# Patient Record
Sex: MALE | Race: WHITE | NOT HISPANIC OR LATINO | Employment: OTHER | URBAN - METROPOLITAN AREA
[De-identification: names, ages, dates, MRNs, and addresses within clinical notes are randomized per-mention and may not be internally consistent; named-entity substitution may affect disease eponyms.]

---

## 2019-11-03 ENCOUNTER — HOSPITAL ENCOUNTER (EMERGENCY)
Facility: HOSPITAL | Age: 64
Discharge: HOME | End: 2019-11-03
Attending: FAMILY MEDICINE
Payer: MEDICARE

## 2019-11-03 VITALS
RESPIRATION RATE: 15 BRPM | WEIGHT: 170.5 LBS | SYSTOLIC BLOOD PRESSURE: 129 MMHG | TEMPERATURE: 98 F | HEART RATE: 59 BPM | DIASTOLIC BLOOD PRESSURE: 72 MMHG | OXYGEN SATURATION: 97 %

## 2019-11-03 DIAGNOSIS — R06.02 SOB (SHORTNESS OF BREATH): ICD-10-CM

## 2019-11-03 DIAGNOSIS — R00.2 PALPITATION: ICD-10-CM

## 2019-11-03 DIAGNOSIS — R00.2 PALPITATIONS: Primary | ICD-10-CM

## 2019-11-03 LAB
ALBUMIN SERPL BCP-MCNC: 4.6 G/DL (ref 3.5–5.2)
ALP SERPL-CCNC: 53 U/L (ref 55–135)
ALT SERPL W/O P-5'-P-CCNC: 12 U/L (ref 10–44)
AMPHET+METHAMPHET UR QL: NEGATIVE
ANION GAP SERPL CALC-SCNC: 11 MMOL/L (ref 8–16)
APTT BLDCRRT: 31.8 SEC (ref 21–32)
AST SERPL-CCNC: 20 U/L (ref 10–40)
BARBITURATES UR QL SCN>200 NG/ML: NEGATIVE
BASOPHILS # BLD AUTO: 0.06 K/UL (ref 0–0.2)
BASOPHILS NFR BLD: 0.7 % (ref 0–1.9)
BENZODIAZ UR QL SCN>200 NG/ML: NEGATIVE
BILIRUB SERPL-MCNC: 0.9 MG/DL (ref 0.1–1)
BILIRUB UR QL STRIP: NEGATIVE
BNP SERPL-MCNC: 20 PG/ML (ref 0–99)
BUN SERPL-MCNC: 14 MG/DL (ref 8–23)
BZE UR QL SCN: NEGATIVE
CALCIUM SERPL-MCNC: 9.9 MG/DL (ref 8.7–10.5)
CANNABINOIDS UR QL SCN: NORMAL
CHLORIDE SERPL-SCNC: 100 MMOL/L (ref 95–110)
CLARITY UR: CLEAR
CO2 SERPL-SCNC: 28 MMOL/L (ref 23–29)
COLOR UR: YELLOW
CREAT SERPL-MCNC: 0.9 MG/DL (ref 0.5–1.4)
CREAT UR-MCNC: 43 MG/DL (ref 23–375)
DIFFERENTIAL METHOD: ABNORMAL
EOSINOPHIL # BLD AUTO: 0.2 K/UL (ref 0–0.5)
EOSINOPHIL NFR BLD: 2.7 % (ref 0–8)
ERYTHROCYTE [DISTWIDTH] IN BLOOD BY AUTOMATED COUNT: 11.4 % (ref 11.5–14.5)
EST. GFR  (AFRICAN AMERICAN): >60 ML/MIN/1.73 M^2
EST. GFR  (NON AFRICAN AMERICAN): >60 ML/MIN/1.73 M^2
GLUCOSE SERPL-MCNC: 93 MG/DL (ref 70–110)
GLUCOSE UR QL STRIP: NEGATIVE
HCT VFR BLD AUTO: 49.4 % (ref 40–54)
HCV AB SERPL QL IA: NEGATIVE
HGB BLD-MCNC: 16.4 G/DL (ref 14–18)
HGB UR QL STRIP: NEGATIVE
IMM GRANULOCYTES # BLD AUTO: 0.03 K/UL (ref 0–0.04)
IMM GRANULOCYTES NFR BLD AUTO: 0.4 % (ref 0–0.5)
INR PPP: 1 (ref 0.8–1.2)
KETONES UR QL STRIP: ABNORMAL
LEUKOCYTE ESTERASE UR QL STRIP: NEGATIVE
LYMPHOCYTES # BLD AUTO: 2.7 K/UL (ref 1–4.8)
LYMPHOCYTES NFR BLD: 31.2 % (ref 18–48)
MAGNESIUM SERPL-MCNC: 2.1 MG/DL (ref 1.6–2.6)
MCH RBC QN AUTO: 31.2 PG (ref 27–31)
MCHC RBC AUTO-ENTMCNC: 33.2 G/DL (ref 32–36)
MCV RBC AUTO: 94 FL (ref 82–98)
METHADONE UR QL SCN>300 NG/ML: NEGATIVE
MONOCYTES # BLD AUTO: 0.6 K/UL (ref 0.3–1)
MONOCYTES NFR BLD: 6.4 % (ref 4–15)
NEUTROPHILS # BLD AUTO: 5 K/UL (ref 1.8–7.7)
NEUTROPHILS NFR BLD: 58.6 % (ref 38–73)
NITRITE UR QL STRIP: NEGATIVE
NRBC BLD-RTO: 0 /100 WBC
OPIATES UR QL SCN: NEGATIVE
PCP UR QL SCN>25 NG/ML: NEGATIVE
PH UR STRIP: 7 [PH] (ref 5–8)
PLATELET # BLD AUTO: 222 K/UL (ref 150–350)
PMV BLD AUTO: 8.8 FL (ref 9.2–12.9)
POTASSIUM SERPL-SCNC: 4.2 MMOL/L (ref 3.5–5.1)
PROT SERPL-MCNC: 7.7 G/DL (ref 6–8.4)
PROT UR QL STRIP: NEGATIVE
PROTHROMBIN TIME: 10.7 SEC (ref 9–12.5)
RBC # BLD AUTO: 5.26 M/UL (ref 4.6–6.2)
SODIUM SERPL-SCNC: 139 MMOL/L (ref 136–145)
SP GR UR STRIP: 1.01 (ref 1–1.03)
TOXICOLOGY INFORMATION: NORMAL
TROPONIN I SERPL DL<=0.01 NG/ML-MCNC: <0.006 NG/ML (ref 0–0.03)
TSH SERPL DL<=0.005 MIU/L-ACNC: 0.72 UIU/ML (ref 0.4–4)
URN SPEC COLLECT METH UR: ABNORMAL
UROBILINOGEN UR STRIP-ACNC: NEGATIVE EU/DL
WBC # BLD AUTO: 8.55 K/UL (ref 3.9–12.7)

## 2019-11-03 PROCEDURE — 80307 DRUG TEST PRSMV CHEM ANLYZR: CPT

## 2019-11-03 PROCEDURE — 84484 ASSAY OF TROPONIN QUANT: CPT

## 2019-11-03 PROCEDURE — 83880 ASSAY OF NATRIURETIC PEPTIDE: CPT

## 2019-11-03 PROCEDURE — 36000 PLACE NEEDLE IN VEIN: CPT

## 2019-11-03 PROCEDURE — 99285 EMERGENCY DEPT VISIT HI MDM: CPT | Mod: 25

## 2019-11-03 PROCEDURE — 80053 COMPREHEN METABOLIC PANEL: CPT

## 2019-11-03 PROCEDURE — 93005 ELECTROCARDIOGRAM TRACING: CPT

## 2019-11-03 PROCEDURE — 85025 COMPLETE CBC W/AUTO DIFF WBC: CPT

## 2019-11-03 PROCEDURE — 85610 PROTHROMBIN TIME: CPT

## 2019-11-03 PROCEDURE — 36415 COLL VENOUS BLD VENIPUNCTURE: CPT

## 2019-11-03 PROCEDURE — 81003 URINALYSIS AUTO W/O SCOPE: CPT | Mod: 59

## 2019-11-03 PROCEDURE — 84443 ASSAY THYROID STIM HORMONE: CPT

## 2019-11-03 PROCEDURE — 93010 ELECTROCARDIOGRAM REPORT: CPT | Mod: ,,, | Performed by: INTERNAL MEDICINE

## 2019-11-03 PROCEDURE — 83735 ASSAY OF MAGNESIUM: CPT

## 2019-11-03 PROCEDURE — 86803 HEPATITIS C AB TEST: CPT

## 2019-11-03 PROCEDURE — 85730 THROMBOPLASTIN TIME PARTIAL: CPT

## 2019-11-03 PROCEDURE — 93010 EKG 12-LEAD: ICD-10-PCS | Mod: ,,, | Performed by: INTERNAL MEDICINE

## 2019-11-03 NOTE — ED PROVIDER NOTES
SCRIBE #1 NOTE: I, Tk Luis, am scribing for, and in the presence of, Denisse Min MD. I have scribed the HPI, ROS, and PEx.     SCRIBE #2 NOTE: I, Don Smith, am scribing for, and in the presence of,  Cecilia Wesley MD. I have scribed the remaining portions of the note not scribed by Scribe #1.     History      Chief Complaint   Patient presents with    Palpitations     c/o hard, fast heartrate at times during the day with some random SOB. Pt denies any symptoms at this time.       Review of patient's allergies indicates:   Allergen Reactions    Pcn [penicillins]         HPI   HPI    11/3/2019, 3:07 PM   History obtained from the patient      History of Present Illness: Júnior Teague is a 64 y.o. male patient who presents to the Emergency Department for palpitations, onset several weeks PTA. Symptoms are intermittent and moderate in severity. No mitigating or exacerbating factors reported. No associated sxs reported. Patient denies any fever, chills, n/v/d, SOB, CP, weakness, numbness, dizziness, headache, and all other sxs at this time. No prior Tx reported. Pt states that he started Flomax 1 month ago. No further complaints or concerns at this time.     Arrival mode: Personal vehicle    PCP: [facility]       Past Medical History:  History reviewed. No pertinent past medical history.    Past Surgical History:  Past Surgical History:   Procedure Laterality Date    HERNIA REPAIR      KNEE SURGERY      L    SHOULDER SURGERY      L         Family History:  History reviewed. No pertinent family history.    Social History:  Social History     Tobacco Use    Smoking status: Former Smoker     Years: 40.00   Substance and Sexual Activity    Alcohol use: Not Currently    Drug use: Yes     Types: Marijuana    Sexual activity: unknown       ROS   Review of Systems   Constitutional: Negative for chills, diaphoresis, fatigue and fever.   HENT: Negative for sore throat.    Respiratory: Negative  for shortness of breath.    Cardiovascular: Positive for palpitations (intermittent). Negative for chest pain.   Gastrointestinal: Negative for nausea and vomiting.   Genitourinary: Negative for dysuria.   Musculoskeletal: Negative for back pain.   Skin: Negative for rash and wound.   Neurological: Negative for dizziness, weakness, light-headedness, numbness and headaches.   Hematological: Does not bruise/bleed easily.   All other systems reviewed and are negative.    Physical Exam      Initial Vitals [11/03/19 1406]   BP Pulse Resp Temp SpO2   138/80 73 18 98.1 °F (36.7 °C) 97 %      MAP       --          Physical Exam  Nursing Notes and Vital Signs Reviewed.  Constitutional: Patient is in no acute distress. Well-developed and well-nourished.  Head: Atraumatic. Normocephalic.  Eyes: PERRL. EOM intact. Conjunctivae are not pale. No scleral icterus.  ENT: Mucous membranes are moist. Oropharynx is clear and symmetric.    Neck: Supple. Full ROM. No lymphadenopathy.  Cardiovascular: Bradycardic. Regular rhythm. No murmurs, rubs, or gallops. Distal pulses are 2+ and symmetric.  Pulmonary/Chest: Decreased breath sounds bilaterally, secondary to body habitus. No wheezing or rales.  Abdominal: Soft and non-distended.  There is no tenderness.  No rebound, guarding, or rigidity.   Musculoskeletal: Moves all extremities. No obvious deformities. No edema.  Skin: Warm and dry.  Neurological:  Alert, awake, and appropriate.  Normal speech.  No acute focal neurological deficits are appreciated.  Psychiatric: Normal affect. Good eye contact. Appropriate in content.    ED Course    Procedures  ED Vital Signs:  Vitals:    11/03/19 1406 11/03/19 1631   BP: 138/80 129/72   Pulse: 73 (!) 59   Resp: 18 15   Temp: 98.1 °F (36.7 °C)    TempSrc: Oral    SpO2: 97% 97%   Weight: 77.3 kg (170 lb 8.4 oz)        Abnormal Lab Results:  Labs Reviewed   CBC W/ AUTO DIFFERENTIAL - Abnormal; Notable for the following components:       Result Value     Mean Corpuscular Hemoglobin 31.2 (*)     RDW 11.4 (*)     MPV 8.8 (*)     All other components within normal limits   COMPREHENSIVE METABOLIC PANEL - Abnormal; Notable for the following components:    Alkaline Phosphatase 53 (*)     All other components within normal limits   URINALYSIS, REFLEX TO URINE CULTURE - Abnormal; Notable for the following components:    Ketones, UA Trace (*)     All other components within normal limits    Narrative:     Preferred Collection Type->Urine, Clean Catch   HEPATITIS C ANTIBODY   APTT   TROPONIN I   B-TYPE NATRIURETIC PEPTIDE   DRUG SCREEN PANEL, URINE EMERGENCY    Narrative:     Preferred Collection Type->Urine, Clean Catch   MAGNESIUM   PROTIME-INR   TSH        All Lab Results:  Results for orders placed or performed during the hospital encounter of 11/03/19   Hepatitis C antibody   Result Value Ref Range    Hepatitis C Ab Negative Negative   APTT   Result Value Ref Range    aPTT 31.8 21.0 - 32.0 sec   CBC auto differential   Result Value Ref Range    WBC 8.55 3.90 - 12.70 K/uL    RBC 5.26 4.60 - 6.20 M/uL    Hemoglobin 16.4 14.0 - 18.0 g/dL    Hematocrit 49.4 40.0 - 54.0 %    Mean Corpuscular Volume 94 82 - 98 fL    Mean Corpuscular Hemoglobin 31.2 (H) 27.0 - 31.0 pg    Mean Corpuscular Hemoglobin Conc 33.2 32.0 - 36.0 g/dL    RDW 11.4 (L) 11.5 - 14.5 %    Platelets 222 150 - 350 K/uL    MPV 8.8 (L) 9.2 - 12.9 fL    Immature Granulocytes 0.4 0.0 - 0.5 %    Gran # (ANC) 5.0 1.8 - 7.7 K/uL    Immature Grans (Abs) 0.03 0.00 - 0.04 K/uL    Lymph # 2.7 1.0 - 4.8 K/uL    Mono # 0.6 0.3 - 1.0 K/uL    Eos # 0.2 0.0 - 0.5 K/uL    Baso # 0.06 0.00 - 0.20 K/uL    nRBC 0 0 /100 WBC    Gran% 58.6 38.0 - 73.0 %    Lymph% 31.2 18.0 - 48.0 %    Mono% 6.4 4.0 - 15.0 %    Eosinophil% 2.7 0.0 - 8.0 %    Basophil% 0.7 0.0 - 1.9 %    Differential Method Automated    Comprehensive metabolic panel   Result Value Ref Range    Sodium 139 136 - 145 mmol/L    Potassium 4.2 3.5 - 5.1 mmol/L    Chloride  100 95 - 110 mmol/L    CO2 28 23 - 29 mmol/L    Glucose 93 70 - 110 mg/dL    BUN, Bld 14 8 - 23 mg/dL    Creatinine 0.9 0.5 - 1.4 mg/dL    Calcium 9.9 8.7 - 10.5 mg/dL    Total Protein 7.7 6.0 - 8.4 g/dL    Albumin 4.6 3.5 - 5.2 g/dL    Total Bilirubin 0.9 0.1 - 1.0 mg/dL    Alkaline Phosphatase 53 (L) 55 - 135 U/L    AST 20 10 - 40 U/L    ALT 12 10 - 44 U/L    Anion Gap 11 8 - 16 mmol/L    eGFR if African American >60 >60 mL/min/1.73 m^2    eGFR if non African American >60 >60 mL/min/1.73 m^2   Troponin I #1   Result Value Ref Range    Troponin I <0.006 0.000 - 0.026 ng/mL   B-Type natriuretic peptide (BNP)   Result Value Ref Range    BNP 20 0 - 99 pg/mL   Drug screen panel, emergency   Result Value Ref Range    Benzodiazepines Negative     Methadone metabolites Negative     Cocaine (Metab.) Negative     Opiate Scrn, Ur Negative     Barbiturate Screen, Ur Negative     Amphetamine Screen, Ur Negative     THC Presumptive Positive     Phencyclidine Negative     Creatinine, Random Ur 43.0 23.0 - 375.0 mg/dL    Toxicology Information SEE COMMENT    Magnesium   Result Value Ref Range    Magnesium 2.1 1.6 - 2.6 mg/dL   Protime-INR   Result Value Ref Range    Prothrombin Time 10.7 9.0 - 12.5 sec    INR 1.0 0.8 - 1.2   Urinalysis, Reflex to Urine Culture Urine, Clean Catch   Result Value Ref Range    Specimen UA Urine, Clean Catch     Color, UA Yellow Yellow, Straw, Treva    Appearance, UA Clear Clear    pH, UA 7.0 5.0 - 8.0    Specific Gravity, UA 1.010 1.005 - 1.030    Protein, UA Negative Negative    Glucose, UA Negative Negative    Ketones, UA Trace (A) Negative    Bilirubin (UA) Negative Negative    Occult Blood UA Negative Negative    Nitrite, UA Negative Negative    Urobilinogen, UA Negative <2.0 EU/dL    Leukocytes, UA Negative Negative   TSH   Result Value Ref Range    TSH 0.718 0.400 - 4.000 uIU/mL     Imaging Results:  Imaging Results          X-Ray Chest AP Portable (Final result)  Result time 11/03/19 14:50:43     Final result by LISA Flores Sr., MD (11/03/19 14:50:43)                 Impression:      1. The lungs are clear.  2. There is a minimal amount of levoconvex curvature of the lumbar spine.  3. There are several oval-shaped osseous densities projected superior to the lateral end of the right clavicle.  One of the larger ones measures 10 mm.  These are characteristic of fracture fragments or unfused accessory ossification centers.      Electronically signed by: Doyle Flores MD  Date:    11/03/2019  Time:    14:50             Narrative:    EXAMINATION:  XR CHEST AP PORTABLE    CLINICAL HISTORY:  Chest Pain;    COMPARISON:  None    FINDINGS:  The size of the heart is within normal limits.  The lungs are clear.  There is no pneumothorax.  The costophrenic angles are sharp.  There is a minimal amount of levoconvex curvature of the lumbar spine.  There are several oval-shaped osseous densities projected superior to the lateral end of the right clavicle.  One of the larger ones measures 10 mm.                               The EKG was ordered, reviewed, and independently interpreted by the ED provider.  Interpretation time: 15:09  Rate: 64 BPM  Rhythm: normal sinus rhythm  Interpretation: No acute ST changes. No STEMI.           The Emergency Provider reviewed the vital signs and test results, which are outlined above.    ED Discussion          4:00 PM: Dr. Min transfers care of pt to Dr. Sy Wesley pending lab results.    4:39 PM: Reassessed pt at this time, Intermittent palpitations x several weeks, no exacerbating factors, no chest pain, no shortness of breath or dizziness, no risk factors for ACS, clinically stable for discharge and outpatient follow up.  Pt states his condition has improved at this time. Discussed with pt all pertinent ED information and results. Discussed pt dx and plan of tx. Gave pt all f/u and return to the ED instructions. All questions and concerns were addressed at this time. Pt  expresses understanding of information and instructions, and is comfortable with plan to discharge. Pt is stable for discharge. Instructed pt to follow up with cardiologist ASAP.    I discussed with patient and/or family/caretaker that evaluation in the ED does not suggest any emergent or life threatening medical conditions requiring immediate intervention beyond what was provided in the ED, and I believe patient is safe for discharge.  Regardless, an unremarkable evaluation in the ED does not preclude the development or presence of a serious of life threatening condition. As such, patient was instructed to return immediately for any worsening or change in current symptoms.      ED Medication(s):  Medications - No data to display    Follow-up Information     [facility]. Schedule an appointment as soon as possible for a visit in 1 day.           [facility]. Schedule an appointment as soon as possible for a visit in 1 day.    Specialty:  Cardiology  Why:  Return to the Emergency Room, If symptoms worsen  Contact information:  [address] [facility] [address]  [phone]  Additional information:  3rd Floor - From [address], take the [facility] exit ([address]). Enter the facility from the [address]                New Prescriptions    No medications on file          Medication List      You have not been prescribed any medications.           Medical Decision Making    Medical Decision Making:   Clinical Tests:   Lab Tests: Ordered and Reviewed  Radiological Study: Ordered and Reviewed  Medical Tests: Ordered and Reviewed           Scribe Attestation:   Scribe #1: I performed the above scribed service and the documentation accurately describes the services I performed. I attest to the accuracy of the note.    Attending:   Physician Attestation Statement for Scribe #1: I, Denisse Min MD, personally performed the services described in this documentation, as scribed by Tk  Janelle, in my presence, and it is both accurate and complete.       Scribe Attestation:   Scribe #2: I performed the above scribed service and the documentation accurately describes the services I performed. I attest to the accuracy of the note.    Attending Attestation:           Physician Attestation for Scribe:    Physician Attestation Statement for Scribe #2: I, Cecilia Wesley MD, reviewed documentation, as scribed by Don Smith in my presence, and it is both accurate and complete. I also acknowledge and confirm the content of the note done by Scribe #1.          Clinical Impression       ICD-10-CM ICD-9-CM   1. Palpitations R00.2 785.1   2. Palpitation R00.2 785.1   3. SOB (shortness of breath) R06.02 786.05       Disposition:   Disposition: Discharged  Condition: Stable         Cecilia Wesley MD  11/03/19 3224

## 2019-11-04 ENCOUNTER — OFFICE VISIT (OUTPATIENT)
Dept: SURGERY | Facility: CLINIC | Age: 64
End: 2019-11-04
Payer: MEDICARE

## 2019-11-04 VITALS
TEMPERATURE: 99 F | HEIGHT: 71 IN | BODY MASS INDEX: 23.8 KG/M2 | HEART RATE: 62 BPM | SYSTOLIC BLOOD PRESSURE: 126 MMHG | WEIGHT: 170 LBS | DIASTOLIC BLOOD PRESSURE: 85 MMHG

## 2019-11-04 DIAGNOSIS — N40.0 ENLARGED PROSTATE: ICD-10-CM

## 2019-11-04 DIAGNOSIS — R00.0 TACHYCARDIA: ICD-10-CM

## 2019-11-04 DIAGNOSIS — K40.90 NON-RECURRENT INGUINAL HERNIA OF RIGHT SIDE WITHOUT OBSTRUCTION OR GANGRENE: Primary | ICD-10-CM

## 2019-11-04 PROCEDURE — 99999 PR PBB SHADOW E&M-EST. PATIENT-LVL III: ICD-10-PCS | Mod: PBBFAC,,, | Performed by: SURGERY

## 2019-11-04 PROCEDURE — 99204 OFFICE O/P NEW MOD 45 MIN: CPT | Mod: S$GLB,,, | Performed by: SURGERY

## 2019-11-04 PROCEDURE — 99204 PR OFFICE/OUTPT VISIT, NEW, LEVL IV, 45-59 MIN: ICD-10-PCS | Mod: S$GLB,,, | Performed by: SURGERY

## 2019-11-04 PROCEDURE — 99213 OFFICE O/P EST LOW 20 MIN: CPT | Mod: PBBFAC | Performed by: SURGERY

## 2019-11-04 PROCEDURE — 99999 PR PBB SHADOW E&M-EST. PATIENT-LVL III: CPT | Mod: PBBFAC,,, | Performed by: SURGERY

## 2019-11-04 RX ORDER — TAMSULOSIN HYDROCHLORIDE 0.4 MG/1
0.4 CAPSULE ORAL DAILY
COMMUNITY

## 2019-11-04 NOTE — PROGRESS NOTES
Patient ID: Júnior Teague is a 64 y.o. male.    Right inguinal hernia    Chief Complaint: Consult and Hernia      HPI:  Patient was found to have a right inguinal hernia by his primary care doctor.  He is sent for surgical evaluation.  He notices a small bulge in the right groin.  Does not have significant pain.    Patient was in the emergency room over the weekend for tachycardiac and has a cardiology appointment on Thursday.      Review of Systems   Constitutional: Negative.    HENT: Negative.    Eyes: Negative.    Respiratory: Negative.    Cardiovascular: Negative.    Gastrointestinal: Negative.         Small right groin   Endocrine: Negative.    Genitourinary: Negative.    Musculoskeletal: Negative.    Skin: Negative.    Allergic/Immunologic: Negative.    Neurological: Negative.    Hematological: Negative.    Psychiatric/Behavioral: Negative.        Current Outpatient Medications   Medication Sig Dispense Refill    tamsulosin (FLOMAX) 0.4 mg Cap Take 0.4 mg by mouth once daily.       No current facility-administered medications for this visit.        Review of patient's allergies indicates:   Allergen Reactions    Pcn [penicillins]        Past Medical History:   Diagnosis Date    Enlarged prostate        Past Surgical History:   Procedure Laterality Date    HERNIA REPAIR Left     open, mesh ([facility] )    KNEE SURGERY      L    SHOULDER SURGERY      L       No family history on file.    Social History     Socioeconomic History    Marital status: Single     Spouse name: Not on file    Number of children: Not on file    Years of education: Not on file    Highest education level: Not on file   Occupational History    Not on file   Social Needs    Financial resource strain: Not on file    Food insecurity:     Worry: Not on file     Inability: Not on file    Transportation needs:     Medical: Not on file     Non-medical: Not on file   Tobacco Use    Smoking status: Former Smoker     Years: 40.00    Substance and Sexual Activity    Alcohol use: Not Currently    Drug use: Yes     Types: Marijuana    Sexual activity: Not on file   Lifestyle    Physical activity:     Days per week: Not on file     Minutes per session: Not on file    Stress: Not on file   Relationships    Social connections:     Talks on phone: Not on file     Gets together: Not on file     Attends Sabianist service: Not on file     Active member of club or organization: Not on file     Attends meetings of clubs or organizations: Not on file     Relationship status: Not on file   Other Topics Concern    Not on file   Social History Narrative    Not on file       Vitals:    11/04/19 1547   BP: 126/85   Pulse: 62   Temp: 98.5 °F (36.9 °C)       Physical Exam   Constitutional: He is oriented to person, place, and time. He appears well-developed and well-nourished. No distress.   HENT:   Head: Normocephalic and atraumatic.   Eyes: Pupils are equal, round, and reactive to light. No scleral icterus.   Neck: Normal range of motion. Neck supple. No JVD present. No tracheal deviation present. No thyromegaly present.   Cardiovascular: Normal rate, regular rhythm and normal heart sounds.   Pulmonary/Chest: Effort normal and breath sounds normal.   Abdominal: Soft. Bowel sounds are normal. He exhibits no distension and no mass. There is no tenderness. There is no rebound and no guarding. A hernia (Reducible right inguinal) is present.   Musculoskeletal: Normal range of motion.   Lymphadenopathy:     He has no cervical adenopathy.   Neurological: He is alert and oriented to person, place, and time.   Skin: Skin is warm and dry.   Psychiatric: He has a normal mood and affect. His behavior is normal. Judgment and thought content normal.        Emergency room records labs EKG and imaging studies were all reviewed    Assessment & Plan:    reducible right inguinal hernia. I would recommend a robotic repair with mesh.  The rationale for the robotic repair  was discussed.  The rationale for using mesh was discussed.  Consent was obtained.    The patient will need to see his cardiologist on Thursday.  Once the cardiologist has completed their workup and if there are no contraindications to surgery will contact the patient and schedule the robotic hernia repair.      Risk of hernia repair includes infection, bleeding, mesh infection, testicular atrophy, and pain or numbness in the groin that may be long-lasting.

## 2019-11-04 NOTE — PATIENT INSTRUCTIONS
"The stone instructions for surgery are as follows.    Once we decide on the date the hospital call you the night before with a time for arrival.      There is no solid food after midnight, but you may have clear liquids up to 3 hr before your arrival time    Please take all your medicines with a sip of water the morning of surgery, especially the Flomax.    Please bathe with the soap as instruction    Our office phone numbers are  [phone] and       [facility]  Instructions for Patients and Families    You are invited to share your experience with me and my staff.  If you receive a survey in the mail, please return it at your convenience, or complete a brief survey on [employer].  We value your opinion!        Did you know that [facility] can be used to make appointments?  Just select "Schedule Appointment" under the "Visits" menu.    Notify you if any of your preop laboratories show abnormalities.  I    Before surgery:  The pre-op nurse from the hospital will call you before the day of your surgery to confirm your arrival time.  The time of your surgery may change due to emergencies or other unforeseen events.  Do not eat or drink anything after midnight the night before your procedure, except for clear liquids up to three hours before your surgery time, and sips of water with medication.  If you are not diabetic, it is recommended that you drink a glass of clear fruit juice (apple, grape, cranberry, not orange) three hours before your surgery time, but nothing after that.  If you are diabetic, you may have water or sugar-free clear liquids such as Crystal Light up to three hours before your surgery time.    Day of Surgery:  · You will go to a pre-op area where an IV will be started and you will speak to the anesthesiologist and surgeon.  · Your family will be updated throughout the operation.  After surgery, your family member may be taken to a private room for " consultation with the surgeon.  This is for the privacy of your medical information and does not necessarily mean there is anything wrong.    If your incisions have:  · Glue:  You may take a bath or shower immediately and wash your skin as you normally do.  The glue will eventually crumble or peel off. Do not let your incisions soak under water.  · Strips: Leave them on, but it is OK if they fall off on their own. It is OK to get them wet 48 hours after surgery.  · Bandage: You may remove it 2 days after your surgery, and then you may leave the incision open and take a shower or bath, unless otherwise instructed. If your bandage has clear plastic, you may shower with it on and then remove it 2 days after surgery.    Activities  · Walking is recommended after surgery; bed rest is not recommended unless specifically ordered.  · If you have had abdominal surgery, do not lift over 20 pounds for 4 weeks after surgery.  · If you have had hernia surgery, do not lift over 20 pounds for 6 weeks after surgery.  · You may drive when you are off your post-operative pain medication.  · Do not smoke after surgery, it decreases your ability to heal and increases the risk of infection and pneumonia.    Diet:  Drink lots of fluids after surgery.  You might not have much of an appetite at first, you may eat regular food when you feel ready, unless you are given special diet instructions.    Post-operative symptoms and medications  · It is safe to take over-the-counter medications for constipation, heartburn, sleep, or itching if needed.  Prescription pain medication may contain acetaminophen (Tylenol), so you should not take additional acetaminophen (Tylenol) at the same time as your pain medication.  · You may experience nausea, low fever/chills, and clear drainage from your incision, sometimes up to a month after surgery.  Notify our office if you have fever over 101 degrees, worsening redness around your incision, thick cloudy  "drainage, or inability to drink any liquids.  · You will experience some level of pain after surgery.  Your pain medication should help with the pain, but may not be able to eliminate it entirely.  Pain will decrease with time, and most pain will be gone by 4 to 6 weeks after surgery.  · We are not able to call in prescriptions for pain medication after hours or on weekends.  If your pain medication is ineffective or you will run out soon and need a refill, please call our office at [phone].  We are not able to replace pain medication that has been lost or stolen.    After surgery, you will either be discharged home or admitted to the hospital.  If you are admitted to the hospital, one of the surgeons or a physician assistant will see you once a day.  Due to scheduled surgery, we may see you in the afternoon or at night; however, your nurse is able to page us at any time.  If you feel there is a situation that is not being addressed properly, please dial [phone] from the phone in your room.    Follow-up appointment  · You will see your surgeon or a physician assistant in clinic for a follow-up appointment at either our [facility] (off [facility]) or [facility] (off [facility]) locations, usually between one and four weeks after your surgery.  · The hospital nurses can make your follow up appointment, or you can make it online at [email or web address] or call [phone].  · If you have a smartphone with the [employer] xu, please let us know if you would like to do a phone visit instead of a post-op office visit.    If you are signed up for [facility], install the "[employer]" xu to access your test results, send messages to your doctors, and schedule appointments from your smartphone!    "

## 2019-11-07 ENCOUNTER — OFFICE VISIT (OUTPATIENT)
Dept: CARDIOLOGY | Facility: CLINIC | Age: 64
End: 2019-11-07
Payer: MEDICARE

## 2019-11-07 VITALS
BODY MASS INDEX: 23.86 KG/M2 | HEART RATE: 62 BPM | DIASTOLIC BLOOD PRESSURE: 78 MMHG | WEIGHT: 171.06 LBS | SYSTOLIC BLOOD PRESSURE: 120 MMHG

## 2019-11-07 DIAGNOSIS — R00.2 PALPITATIONS: Primary | ICD-10-CM

## 2019-11-07 DIAGNOSIS — Z01.810 PREOP CARDIOVASCULAR EXAM: ICD-10-CM

## 2019-11-07 PROCEDURE — 99204 PR OFFICE/OUTPT VISIT, NEW, LEVL IV, 45-59 MIN: ICD-10-PCS | Mod: S$GLB,,, | Performed by: INTERNAL MEDICINE

## 2019-11-07 PROCEDURE — 99999 PR PBB SHADOW E&M-EST. PATIENT-LVL II: ICD-10-PCS | Mod: PBBFAC,,, | Performed by: INTERNAL MEDICINE

## 2019-11-07 PROCEDURE — 99999 PR PBB SHADOW E&M-EST. PATIENT-LVL II: CPT | Mod: PBBFAC,,, | Performed by: INTERNAL MEDICINE

## 2019-11-07 PROCEDURE — 99212 OFFICE O/P EST SF 10 MIN: CPT | Mod: PBBFAC | Performed by: INTERNAL MEDICINE

## 2019-11-07 PROCEDURE — 99204 OFFICE O/P NEW MOD 45 MIN: CPT | Mod: S$GLB,,, | Performed by: INTERNAL MEDICINE

## 2019-11-07 RX ORDER — IBUPROFEN 200 MG
200 TABLET ORAL EVERY 6 HOURS PRN
COMMUNITY

## 2019-11-07 NOTE — PROGRESS NOTES
Subjective:   Patient ID:  Júnior Teague is a 64 y.o. male who presents for cardiac consult of Palpitations (consult )      HPI  The patient came in today for cardiac consult of Palpitations (consult )    19  Júnior Teague is a 64 y.o. male pt with BPH, prior tobacco use,  presents for initial CV eval for palpitations/pre op CV eval.    He was in the ER earlier for palpitations, onset several weeks PTA. Symptoms were  intermittent and moderate in severity. No mitigating or exacerbating factors reported. No associated sxs reported. Patient denies any fever, chills, n/v/d, SOB, CP, weakness, numbness, dizziness, headache, and all other sxs at this time. No prior Tx reported. Labwork, ECG, CXR negative. UDS positive for THC.  FH - CAD    He also has a right inguinal hernia and will need surgery by Dr. Garcia.     ECG - NSR    He has been having palpitations a few months recently but has resolved recently as he stopped drinking coffee. Overall active, walks a dog twice a day.     Patient no chest pain, no sob, no leg swelling, no PND,  no dizziness, no syncope, no CNS symptoms.    Patient has fairly good exercise tolerance.    Patient is compliant with medications.    FH - CAD, father  of MI 71    Past Medical History:   Diagnosis Date    Enlarged prostate        Past Surgical History:   Procedure Laterality Date    HERNIA REPAIR Left     open, mesh ([facility] )    KNEE SURGERY      L    SHOULDER SURGERY      L       Social History     Tobacco Use    Smoking status: Former Smoker     Years: 40.00   Substance Use Topics    Alcohol use: Not Currently    Drug use: Yes     Types: Marijuana     Comment: daily 2 or 3 joints        Family History   Problem Relation Age of Onset    Heart attack Father        Patient's Medications   New Prescriptions    No medications on file   Previous Medications    IBUPROFEN (ADVIL,MOTRIN) 200 MG TABLET    Take 200 mg by mouth every 6 (six) hours as needed.     TAMSULOSIN (FLOMAX) 0.4 MG CAP    Take 0.4 mg by mouth once daily.   Modified Medications    No medications on file   Discontinued Medications    No medications on file       Review of Systems   Constitutional: Negative.    HENT: Negative.    Eyes: Negative.    Respiratory: Negative.    Cardiovascular: Positive for palpitations.   Gastrointestinal: Negative.    Genitourinary: Negative.    Musculoskeletal: Negative.    Skin: Negative.    Neurological: Negative.    Endo/Heme/Allergies: Negative.    Psychiatric/Behavioral: Negative.    All 12 systems otherwise negative.      Wt Readings from Last 3 Encounters:   11/07/19 77.6 kg (171 lb 1.2 oz)   11/04/19 77.1 kg (170 lb)   11/03/19 77.3 kg (170 lb 8.4 oz)     Temp Readings from Last 3 Encounters:   11/04/19 98.5 °F (36.9 °C)   11/03/19 98.1 °F (36.7 °C) (Oral)     BP Readings from Last 3 Encounters:   11/07/19 120/78   11/04/19 126/85   11/03/19 129/72     Pulse Readings from Last 3 Encounters:   11/07/19 62   11/04/19 62   11/03/19 (!) 59       /78 (BP Location: Left arm, Patient Position: Sitting, BP Method: Medium (Manual))   Pulse 62   Wt 77.6 kg (171 lb 1.2 oz)   BMI 23.86 kg/m²     Objective:   Physical Exam   Constitutional: He is oriented to person, place, and time. He appears well-developed and well-nourished. No distress.   HENT:   Head: Normocephalic and atraumatic.   Nose: Nose normal.   Mouth/Throat: Oropharynx is clear and moist.   Eyes: Conjunctivae and EOM are normal. No scleral icterus.   Neck: Normal range of motion. Neck supple. No JVD present. No thyromegaly present.   Cardiovascular: Normal rate, regular rhythm, S1 normal and S2 normal. Exam reveals no gallop, no S3, no S4 and no friction rub.   No murmur heard.  Pulmonary/Chest: Effort normal and breath sounds normal. No stridor. No respiratory distress. He has no wheezes. He has no rales. He exhibits no tenderness.   Abdominal: Soft. Bowel sounds are normal. He exhibits no distension  and no mass. There is no tenderness. There is no rebound.   Genitourinary:   Genitourinary Comments: Deferred   Musculoskeletal: Normal range of motion. He exhibits no edema, tenderness or deformity.   Lymphadenopathy:     He has no cervical adenopathy.   Neurological: He is alert and oriented to person, place, and time. He exhibits normal muscle tone. Coordination normal.   Skin: Skin is warm and dry. No rash noted. He is not diaphoretic. No erythema. No pallor.   Psychiatric: He has a normal mood and affect. His behavior is normal. Judgment and thought content normal.   Nursing note and vitals reviewed.      Lab Results   Component Value Date     11/03/2019    K 4.2 11/03/2019     11/03/2019    CO2 28 11/03/2019    BUN 14 11/03/2019    CREATININE 0.9 11/03/2019    GLU 93 11/03/2019    MG 2.1 11/03/2019    AST 20 11/03/2019    ALT 12 11/03/2019    ALBUMIN 4.6 11/03/2019    PROT 7.7 11/03/2019    BILITOT 0.9 11/03/2019    WBC 8.55 11/03/2019    HGB 16.4 11/03/2019    HCT 49.4 11/03/2019    MCV 94 11/03/2019     11/03/2019    INR 1.0 11/03/2019    TSH 0.718 11/03/2019    BNP 20 11/03/2019     Assessment:      1. Palpitations    2. Preop cardiovascular exam        Plan:   1. Palpitations  - neg workup in ER  - check Holter, avoid caffeine    2. Preop CV eval prior to hernia surgery  - ECG stress test and 2D ECHO to eval valves  - if negative may proceed, will risk stratify     ADDENDUM 12/17/19  Pre-OP CV evaluation prior to hernia surgery  Low periop risk of CV events for moderate risk procedure.  Good functional and exercise capacity.  No chest pain, active arrhythmia and CHF symptoms.  Ok to proceed to the scheduled surgery without further cardiac study.    Negative stress and ECHO     Thank you for allowing me to participate in this patient's care. Please do not hesitate to contact me with any questions or concerns. Consult note has been forwarded to the referral physician.     Marin Lehman MD,  [facility]  Cardiovascular Disease  [facility], [place]  [phone] (P)

## 2019-11-13 ENCOUNTER — TELEPHONE (OUTPATIENT)
Dept: SURGERY | Facility: CLINIC | Age: 64
End: 2019-11-13

## 2019-11-13 NOTE — TELEPHONE ENCOUNTER
----- Message from Era Bronson sent at 11/13/2019  7:42 AM CST -----  Contact: Shayna Teague/sister [phone]  States that she is calling to schedule pt surgery. States that he has been cleared by cardiology. Please call back at [phone]//thank you acc

## 2019-12-16 ENCOUNTER — CLINICAL SUPPORT (OUTPATIENT)
Dept: CARDIOLOGY | Facility: CLINIC | Age: 64
End: 2019-12-16
Attending: INTERNAL MEDICINE
Payer: MEDICARE

## 2019-12-16 DIAGNOSIS — Z01.810 PREOP CARDIOVASCULAR EXAM: ICD-10-CM

## 2019-12-16 DIAGNOSIS — R00.2 PALPITATIONS: ICD-10-CM

## 2019-12-16 LAB
DIASTOLIC DYSFUNCTION: NO
DIASTOLIC DYSFUNCTION: NO
ESTIMATED PA SYSTOLIC PRESSURE: 24.9
RETIRED EF AND QEF - SEE NOTES: 55 (ref 55–65)

## 2019-12-16 PROCEDURE — 93015 CARDIAC TREADMILL STRESS TEST: ICD-10-PCS | Mod: S$GLB,,, | Performed by: INTERNAL MEDICINE

## 2019-12-16 PROCEDURE — 93015 CV STRESS TEST SUPVJ I&R: CPT | Mod: S$GLB,,, | Performed by: INTERNAL MEDICINE

## 2019-12-16 PROCEDURE — 93224 HOLTER MONITOR - 48 HOUR: ICD-10-PCS | Mod: S$GLB,,, | Performed by: INTERNAL MEDICINE

## 2019-12-16 PROCEDURE — 93306 TTE W/DOPPLER COMPLETE: CPT | Mod: S$GLB,,, | Performed by: INTERNAL MEDICINE

## 2019-12-16 PROCEDURE — 93224 XTRNL ECG REC UP TO 48 HRS: CPT | Mod: S$GLB,,, | Performed by: INTERNAL MEDICINE

## 2019-12-16 PROCEDURE — 93306 2D ECHO WITH COLOR FLOW DOPPLER: ICD-10-PCS | Mod: S$GLB,,, | Performed by: INTERNAL MEDICINE

## 2019-12-17 ENCOUNTER — TELEPHONE (OUTPATIENT)
Dept: CARDIOLOGY | Facility: CLINIC | Age: 64
End: 2019-12-17

## 2019-12-17 NOTE — TELEPHONE ENCOUNTER
Spoke with pt about his results, pt states understanding.      Overall normal stress test with mildly elevated BP, awaiting ECHO, will complete cardiac clearance once resulted but should be fine for surgery

## 2019-12-19 ENCOUNTER — TELEPHONE (OUTPATIENT)
Dept: CARDIOLOGY | Facility: CLINIC | Age: 64
End: 2019-12-19

## 2019-12-19 NOTE — TELEPHONE ENCOUNTER
----- Message from Marin Lehman MD sent at 12/17/2019  9:06 AM CST -----  Please call the patient regarding his normal result. Overall normal heart function and valves. Will addened note for surgery clearance.

## 2019-12-19 NOTE — TELEPHONE ENCOUNTER
----- Message from Marin Lehman MD sent at 12/19/2019  9:24 AM CST -----  Please call the patient regarding his result. Overall normal heart monitor.

## 2019-12-20 ENCOUNTER — TELEPHONE (OUTPATIENT)
Dept: CARDIOLOGY | Facility: CLINIC | Age: 64
End: 2019-12-20

## 2019-12-20 NOTE — TELEPHONE ENCOUNTER
----- Message from Eleni Nicole sent at 12/20/2019  8:17 AM CST -----  Contact: Pt  Pt asked that nurse return his call regarding his test results.

## 2019-12-20 NOTE — TELEPHONE ENCOUNTER
Returned call to patient--gave results of holter and echo--patient verbalizes understanding of all information

## 2020-01-14 ENCOUNTER — OFFICE VISIT (OUTPATIENT)
Dept: CARDIOLOGY | Facility: CLINIC | Age: 65
End: 2020-01-14
Payer: MEDICARE

## 2020-01-14 VITALS
SYSTOLIC BLOOD PRESSURE: 120 MMHG | WEIGHT: 173.94 LBS | HEIGHT: 71 IN | DIASTOLIC BLOOD PRESSURE: 74 MMHG | OXYGEN SATURATION: 96 % | BODY MASS INDEX: 24.35 KG/M2 | HEART RATE: 68 BPM

## 2020-01-14 DIAGNOSIS — R00.2 PALPITATIONS: Primary | ICD-10-CM

## 2020-01-14 DIAGNOSIS — Z01.810 PREOP CARDIOVASCULAR EXAM: ICD-10-CM

## 2020-01-14 PROCEDURE — 1101F PR PT FALLS ASSESS DOC 0-1 FALLS W/OUT INJ PAST YR: ICD-10-PCS | Mod: CPTII,S$GLB,, | Performed by: INTERNAL MEDICINE

## 2020-01-14 PROCEDURE — 1101F PT FALLS ASSESS-DOCD LE1/YR: CPT | Mod: CPTII,S$GLB,, | Performed by: INTERNAL MEDICINE

## 2020-01-14 PROCEDURE — 99999 PR PBB SHADOW E&M-EST. PATIENT-LVL III: ICD-10-PCS | Mod: PBBFAC,,, | Performed by: INTERNAL MEDICINE

## 2020-01-14 PROCEDURE — 99214 PR OFFICE/OUTPT VISIT, EST, LEVL IV, 30-39 MIN: ICD-10-PCS | Mod: S$GLB,,, | Performed by: INTERNAL MEDICINE

## 2020-01-14 PROCEDURE — 3008F PR BODY MASS INDEX (BMI) DOCUMENTED: ICD-10-PCS | Mod: CPTII,S$GLB,, | Performed by: INTERNAL MEDICINE

## 2020-01-14 PROCEDURE — 3008F BODY MASS INDEX DOCD: CPT | Mod: CPTII,S$GLB,, | Performed by: INTERNAL MEDICINE

## 2020-01-14 PROCEDURE — 99214 OFFICE O/P EST MOD 30 MIN: CPT | Mod: S$GLB,,, | Performed by: INTERNAL MEDICINE

## 2020-01-14 PROCEDURE — 99999 PR PBB SHADOW E&M-EST. PATIENT-LVL III: CPT | Mod: PBBFAC,,, | Performed by: INTERNAL MEDICINE

## 2020-01-14 NOTE — PROGRESS NOTES
Subjective:   Patient ID:  Júnior Teague is a 64 y.o. male who presents for cardiac consult of Palpitations (2 month f/u)      HPI  The patient came in today for cardiac consult of Palpitations (2 month f/u)      Júnior Teague is a 64 y.o. male pt with BPH, prior tobacco use presents for follow up CV eval.     19  He was in the ER earlier for palpitations, onset several weeks PTA. Symptoms were  intermittent and moderate in severity. No mitigating or exacerbating factors reported. No associated sxs reported. Patient denies any fever, chills, n/v/d, SOB, CP, weakness, numbness, dizziness, headache, and all other sxs at this time. No prior Tx reported. Labwork, ECG, CXR negative. UDS positive for THC.  FH - CAD  He also has a right inguinal hernia and will need surgery by Dr. Garcia.   ECG - NSR  He has been having palpitations a few months recently but has resolved recently as he stopped drinking coffee. Overall active, walks a dog twice a day.     20  ECHO neg, ETT neg but target HR not reached. Holter neg. He is still waiting for hernia surgery. Symptoms are getting worse with mild pain and bulge.     Patient no chest pain, no sob, no leg swelling, no PND,  no dizziness, no syncope, no CNS symptoms.    Patient has fairly good exercise tolerance.    Patient is compliant with medications.    FH - CAD, father  of MI 71      CONCLUSIONS     1 - Concentric remodeling.     2 - No wall motion abnormalities.     3 - Normal left ventricular systolic function (EF 55-60%).     4 - Normal left ventricular diastolic function.     5 - Normal right ventricular systolic function .     6 - The estimated PA systolic pressure is 25 mmHg.     This document has been electronically    SIGNED BY: Estiven Andrews MD On: 2019 17:14    EKG Conclusions:  1. The EKG portion of this study is negative for ischemia at a low workload, and peak heart rate of 113 bpm (73% of predicted).   2. Exercise capacity is average.   3. Blood  pressure response to exercise was hypertensive (Presenting BP: 120/82 Peak BP: 190/98).   4. No significant arrhythmias were present.   5. There were no symptoms of chest discomfort or significant dyspnea throughout the protocol.   6. The Duke treadmill score was 9 suggesting a low probability for future cardiovascular events.    Target heart rate not reached but no ischemia noted.     This document has been electronically    SIGNED BY: Marin Lehman MD On: 12/16/2019 14:02      TEST DESCRIPTION   PREDOMINANT RHYTHM  1. Sinus rhythm with heart rates varying between 39 and 117 bpm with an average of 65 bpm.   VENTRICULAR ARRHYTHMIAS  1. There were very rare PVCs recorded totalling 8 and averaging less than 1 per hour.   2. There were no episodes of ventricular tachycardia.    SUPRA VENTRICULAR ARRHYTHMIAS  1. There were rare PACs totalling 199 and averaging 4 per hour.  There were 9 couplets. There was 1 triplet.   2. There were no episodes of sustained supraventricular tachycardia.    Past Medical History:   Diagnosis Date    Enlarged prostate        Past Surgical History:   Procedure Laterality Date    HERNIA REPAIR Left     open, mesh ([facility] )    KNEE SURGERY      L    SHOULDER SURGERY      L       Social History     Tobacco Use    Smoking status: Former Smoker     Years: 40.00   Substance Use Topics    Alcohol use: Not Currently    Drug use: Yes     Types: Marijuana     Comment: daily 2 or 3 joints        Family History   Problem Relation Age of Onset    Heart attack Father        Patient's Medications   New Prescriptions    No medications on file   Previous Medications    IBUPROFEN (ADVIL,MOTRIN) 200 MG TABLET    Take 200 mg by mouth every 6 (six) hours as needed.    TAMSULOSIN (FLOMAX) 0.4 MG CAP    Take 0.4 mg by mouth once daily.   Modified Medications    No medications on file   Discontinued Medications    No medications on file       Review of Systems   Constitutional: Negative.    HENT:  "Negative.    Eyes: Negative.    Respiratory: Negative.    Cardiovascular: Positive for palpitations.   Gastrointestinal: Negative.    Genitourinary: Negative.    Musculoskeletal: Negative.    Skin: Negative.    Neurological: Negative.    Endo/Heme/Allergies: Negative.    Psychiatric/Behavioral: Negative.    All 12 systems otherwise negative.      Wt Readings from Last 3 Encounters:   01/14/20 78.9 kg (173 lb 15.1 oz)   11/07/19 77.6 kg (171 lb 1.2 oz)   11/04/19 77.1 kg (170 lb)     Temp Readings from Last 3 Encounters:   11/04/19 98.5 °F (36.9 °C)   11/03/19 98.1 °F (36.7 °C) (Oral)     BP Readings from Last 3 Encounters:   01/14/20 120/74   11/07/19 120/78   11/04/19 126/85     Pulse Readings from Last 3 Encounters:   01/14/20 68   11/07/19 62   11/04/19 62       /74 (BP Location: Left arm, Patient Position: Sitting, BP Method: Medium (Manual))   Pulse 68   Ht 5' 11" (1.803 m)   Wt 78.9 kg (173 lb 15.1 oz)   SpO2 96%   BMI 24.26 kg/m²     Objective:   Physical Exam   Constitutional: He is oriented to person, place, and time. He appears well-developed and well-nourished. No distress.   HENT:   Head: Normocephalic and atraumatic.   Nose: Nose normal.   Mouth/Throat: Oropharynx is clear and moist.   Eyes: Conjunctivae and EOM are normal. No scleral icterus.   Neck: Normal range of motion. Neck supple. No JVD present. No thyromegaly present.   Cardiovascular: Normal rate, regular rhythm, S1 normal and S2 normal. Exam reveals no gallop, no S3, no S4 and no friction rub.   No murmur heard.  Pulmonary/Chest: Effort normal and breath sounds normal. No stridor. No respiratory distress. He has no wheezes. He has no rales. He exhibits no tenderness.   Abdominal: Soft. Bowel sounds are normal. He exhibits no distension and no mass. There is no tenderness. There is no rebound.   Genitourinary:   Genitourinary Comments: Deferred   Musculoskeletal: Normal range of motion. He exhibits no edema, tenderness or deformity. "   Lymphadenopathy:     He has no cervical adenopathy.   Neurological: He is alert and oriented to person, place, and time. He exhibits normal muscle tone. Coordination normal.   Skin: Skin is warm and dry. No rash noted. He is not diaphoretic. No erythema. No pallor.   Psychiatric: He has a normal mood and affect. His behavior is normal. Judgment and thought content normal.   Nursing note and vitals reviewed.      Lab Results   Component Value Date     11/03/2019    K 4.2 11/03/2019     11/03/2019    CO2 28 11/03/2019    BUN 14 11/03/2019    CREATININE 0.9 11/03/2019    GLU 93 11/03/2019    MG 2.1 11/03/2019    AST 20 11/03/2019    ALT 12 11/03/2019    ALBUMIN 4.6 11/03/2019    PROT 7.7 11/03/2019    BILITOT 0.9 11/03/2019    WBC 8.55 11/03/2019    HGB 16.4 11/03/2019    HCT 49.4 11/03/2019    MCV 94 11/03/2019     11/03/2019    INR 1.0 11/03/2019    TSH 0.718 11/03/2019    BNP 20 11/03/2019     Assessment:      1. Palpitations    2. Preop cardiovascular exam        Plan:   1. Palpitations  - neg workup in ER  - Holter - neg, avoid caffeine  - echo normal, stress neg    2. Pre-OP CV evaluation prior to hernia surgery  Low periop risk of CV events for moderate risk procedure.  Good functional and exercise capacity.  No chest pain, active arrhythmia and CHF symptoms.  Ok to proceed to the scheduled surgery without further cardiac study.      Thank you for allowing me to participate in this patient's care. Please do not hesitate to contact me with any questions or concerns. Consult note has been forwarded to the referral physician.     Marin Lehman MD, [facility]  Cardiovascular Disease  [facility], [place]  [phone] (P)

## 2020-01-30 ENCOUNTER — OFFICE VISIT (OUTPATIENT)
Dept: SURGERY | Facility: CLINIC | Age: 65
End: 2020-01-30
Payer: MEDICARE

## 2020-01-30 ENCOUNTER — LAB VISIT (OUTPATIENT)
Dept: LAB | Facility: HOSPITAL | Age: 65
End: 2020-01-30
Attending: SURGERY
Payer: MEDICARE

## 2020-01-30 VITALS
SYSTOLIC BLOOD PRESSURE: 119 MMHG | WEIGHT: 177.5 LBS | HEART RATE: 68 BPM | TEMPERATURE: 98 F | DIASTOLIC BLOOD PRESSURE: 66 MMHG | BODY MASS INDEX: 24.75 KG/M2

## 2020-01-30 DIAGNOSIS — K40.90 NON-RECURRENT INGUINAL HERNIA OF RIGHT SIDE WITHOUT OBSTRUCTION OR GANGRENE: Primary | ICD-10-CM

## 2020-01-30 DIAGNOSIS — K40.90 INGUINAL HERNIA OF RIGHT SIDE WITHOUT OBSTRUCTION OR GANGRENE: ICD-10-CM

## 2020-01-30 DIAGNOSIS — K40.90 NON-RECURRENT INGUINAL HERNIA OF RIGHT SIDE WITHOUT OBSTRUCTION OR GANGRENE: ICD-10-CM

## 2020-01-30 LAB
ALBUMIN SERPL BCP-MCNC: 4 G/DL (ref 3.5–5.2)
ALP SERPL-CCNC: 73 U/L (ref 55–135)
ALT SERPL W/O P-5'-P-CCNC: 19 U/L (ref 10–44)
ANION GAP SERPL CALC-SCNC: 7 MMOL/L (ref 8–16)
AST SERPL-CCNC: 19 U/L (ref 10–40)
BASOPHILS # BLD AUTO: 0.05 K/UL (ref 0–0.2)
BASOPHILS NFR BLD: 0.6 % (ref 0–1.9)
BILIRUB SERPL-MCNC: 0.2 MG/DL (ref 0.1–1)
BUN SERPL-MCNC: 18 MG/DL (ref 8–23)
CALCIUM SERPL-MCNC: 9.3 MG/DL (ref 8.7–10.5)
CHLORIDE SERPL-SCNC: 106 MMOL/L (ref 95–110)
CO2 SERPL-SCNC: 29 MMOL/L (ref 23–29)
CREAT SERPL-MCNC: 0.9 MG/DL (ref 0.5–1.4)
DIFFERENTIAL METHOD: ABNORMAL
EOSINOPHIL # BLD AUTO: 0.4 K/UL (ref 0–0.5)
EOSINOPHIL NFR BLD: 4.5 % (ref 0–8)
ERYTHROCYTE [DISTWIDTH] IN BLOOD BY AUTOMATED COUNT: 12 % (ref 11.5–14.5)
EST. GFR  (AFRICAN AMERICAN): >60 ML/MIN/1.73 M^2
EST. GFR  (NON AFRICAN AMERICAN): >60 ML/MIN/1.73 M^2
GLUCOSE SERPL-MCNC: 91 MG/DL (ref 70–110)
HCT VFR BLD AUTO: 46 % (ref 40–54)
HGB BLD-MCNC: 14.2 G/DL (ref 14–18)
IMM GRANULOCYTES # BLD AUTO: 0.03 K/UL (ref 0–0.04)
IMM GRANULOCYTES NFR BLD AUTO: 0.4 % (ref 0–0.5)
LYMPHOCYTES # BLD AUTO: 3 K/UL (ref 1–4.8)
LYMPHOCYTES NFR BLD: 37.5 % (ref 18–48)
MCH RBC QN AUTO: 30.3 PG (ref 27–31)
MCHC RBC AUTO-ENTMCNC: 30.9 G/DL (ref 32–36)
MCV RBC AUTO: 98 FL (ref 82–98)
MONOCYTES # BLD AUTO: 0.6 K/UL (ref 0.3–1)
MONOCYTES NFR BLD: 7.7 % (ref 4–15)
NEUTROPHILS # BLD AUTO: 3.9 K/UL (ref 1.8–7.7)
NEUTROPHILS NFR BLD: 49.3 % (ref 38–73)
NRBC BLD-RTO: 0 /100 WBC
PLATELET # BLD AUTO: 238 K/UL (ref 150–350)
PMV BLD AUTO: 9.7 FL (ref 9.2–12.9)
POTASSIUM SERPL-SCNC: 4.4 MMOL/L (ref 3.5–5.1)
PROT SERPL-MCNC: 7 G/DL (ref 6–8.4)
RBC # BLD AUTO: 4.68 M/UL (ref 4.6–6.2)
SODIUM SERPL-SCNC: 142 MMOL/L (ref 136–145)
WBC # BLD AUTO: 7.95 K/UL (ref 3.9–12.7)

## 2020-01-30 PROCEDURE — 1101F PT FALLS ASSESS-DOCD LE1/YR: CPT | Mod: CPTII,S$GLB,, | Performed by: SURGERY

## 2020-01-30 PROCEDURE — 3008F BODY MASS INDEX DOCD: CPT | Mod: CPTII,S$GLB,, | Performed by: SURGERY

## 2020-01-30 PROCEDURE — 99213 PR OFFICE/OUTPT VISIT, EST, LEVL III, 20-29 MIN: ICD-10-PCS | Mod: S$GLB,,, | Performed by: SURGERY

## 2020-01-30 PROCEDURE — 85025 COMPLETE CBC W/AUTO DIFF WBC: CPT

## 2020-01-30 PROCEDURE — 80053 COMPREHEN METABOLIC PANEL: CPT

## 2020-01-30 PROCEDURE — 99999 PR PBB SHADOW E&M-EST. PATIENT-LVL IV: ICD-10-PCS | Mod: PBBFAC,,, | Performed by: SURGERY

## 2020-01-30 PROCEDURE — 3008F PR BODY MASS INDEX (BMI) DOCUMENTED: ICD-10-PCS | Mod: CPTII,S$GLB,, | Performed by: SURGERY

## 2020-01-30 PROCEDURE — 99213 OFFICE O/P EST LOW 20 MIN: CPT | Mod: S$GLB,,, | Performed by: SURGERY

## 2020-01-30 PROCEDURE — 36415 COLL VENOUS BLD VENIPUNCTURE: CPT

## 2020-01-30 PROCEDURE — 1101F PR PT FALLS ASSESS DOC 0-1 FALLS W/OUT INJ PAST YR: ICD-10-PCS | Mod: CPTII,S$GLB,, | Performed by: SURGERY

## 2020-01-30 PROCEDURE — 99999 PR PBB SHADOW E&M-EST. PATIENT-LVL IV: CPT | Mod: PBBFAC,,, | Performed by: SURGERY

## 2020-01-30 RX ORDER — ONDANSETRON 4 MG/1
8 TABLET, ORALLY DISINTEGRATING ORAL EVERY 8 HOURS PRN
Status: CANCELLED | OUTPATIENT
Start: 2020-01-30

## 2020-01-30 RX ORDER — LIDOCAINE HYDROCHLORIDE 10 MG/ML
1 INJECTION, SOLUTION EPIDURAL; INFILTRATION; INTRACAUDAL; PERINEURAL ONCE
Status: DISCONTINUED | OUTPATIENT
Start: 2020-01-30 | End: 2020-02-07 | Stop reason: HOSPADM

## 2020-02-03 NOTE — PROGRESS NOTES
Patient ID: Júnior Teague is a 64 y.o. male.    Chief Complaint: Follow-up      HPI:  Patient presents once again to discuss his right hernia repair he had been seen by Cardiology and there is no cardiac contraindications.    He states the hernia slightly larger.  It causes him some mild discomfort      Review of Systems   Constitutional: Negative.    HENT: Negative.    Eyes: Negative.    Respiratory: Negative.    Cardiovascular: Negative.    Gastrointestinal: Negative.         Right groin bulge   Endocrine: Negative.    Genitourinary: Negative.    Musculoskeletal: Negative.    Skin: Negative.    Allergic/Immunologic: Negative.    Neurological: Negative.    Hematological: Negative.    Psychiatric/Behavioral: Negative.        Current Outpatient Medications   Medication Sig Dispense Refill    ibuprofen (ADVIL,MOTRIN) 200 MG tablet Take 200 mg by mouth every 6 (six) hours as needed.      tamsulosin (FLOMAX) 0.4 mg Cap Take 0.4 mg by mouth once daily.       Current Facility-Administered Medications   Medication Dose Route Frequency Provider Last Rate Last Dose    lidocaine (PF) 10 mg/ml (1%) injection 10 mg  1 mL Intradermal Once Earl Garcia MD           Review of patient's allergies indicates:   Allergen Reactions    Pcn [penicillins]        Past Medical History:   Diagnosis Date    Enlarged prostate        Past Surgical History:   Procedure Laterality Date    HERNIA REPAIR Left     open, mesh ([facility] )    KNEE SURGERY      L    SHOULDER SURGERY      L       Family History   Problem Relation Age of Onset    Heart attack Father        Social History     Socioeconomic History    Marital status: Single     Spouse name: Not on file    Number of children: Not on file    Years of education: Not on file    Highest education level: Not on file   Occupational History    Not on file   Social Needs    Financial resource strain: Not on file    Food insecurity:     Worry: Not on file     Inability: Not  on file    Transportation needs:     Medical: Not on file     Non-medical: Not on file   Tobacco Use    Smoking status: Former Smoker     Years: 40.00   Substance and Sexual Activity    Alcohol use: Not Currently    Drug use: Yes     Types: Marijuana     Comment: daily 2 or 3 joints     Sexual activity: Not on file   Lifestyle    Physical activity:     Days per week: Not on file     Minutes per session: Not on file    Stress: Not on file   Relationships    Social connections:     Talks on phone: Not on file     Gets together: Not on file     Attends Catholic service: Not on file     Active member of club or organization: Not on file     Attends meetings of clubs or organizations: Not on file     Relationship status: Not on file   Other Topics Concern    Not on file   Social History Narrative    Not on file       Vitals:    01/30/20 1419   BP: 119/66   Pulse: 68   Temp: 97.8 °F (36.6 °C)       Physical Exam   Constitutional: He is oriented to person, place, and time. He appears well-developed and well-nourished. No distress.   HENT:   Head: Normocephalic and atraumatic.   Eyes: Pupils are equal, round, and reactive to light. No scleral icterus.   Neck: Normal range of motion. Neck supple. No tracheal deviation present.   Cardiovascular: Normal rate, regular rhythm and normal heart sounds.   Pulmonary/Chest: Effort normal and breath sounds normal.   Abdominal: Soft. Bowel sounds are normal. He exhibits no distension and no mass. There is no tenderness. There is no rebound and no guarding. A hernia (Reducible right inguinal hernia) is present.   Musculoskeletal: Normal range of motion.   Neurological: He is alert and oriented to person, place, and time.   Skin: Skin is warm and dry. Capillary refill takes less than 2 seconds.   Psychiatric: He has a normal mood and affect. His behavior is normal. Judgment and thought content normal.       Cardiac evaluation was reviewed  Assessment & Plan:      Reducible  right inguinal hernia.    Open repair with mesh either under general anesthetic or sedation local anesthetic per anesthesiology.    Preop labs were ordered    Risk of hernia repair includes infection, bleeding, mesh infection, testicular atrophy, and pain or numbness in the groin that may be long-lasting.

## 2020-02-03 NOTE — H&P (VIEW-ONLY)
Patient ID: Júnior Teague is a 64 y.o. male.    Chief Complaint: Follow-up      HPI:  Patient presents once again to discuss his right hernia repair he had been seen by Cardiology and there is no cardiac contraindications.    He states the hernia slightly larger.  It causes him some mild discomfort      Review of Systems   Constitutional: Negative.    HENT: Negative.    Eyes: Negative.    Respiratory: Negative.    Cardiovascular: Negative.    Gastrointestinal: Negative.         Right groin bulge   Endocrine: Negative.    Genitourinary: Negative.    Musculoskeletal: Negative.    Skin: Negative.    Allergic/Immunologic: Negative.    Neurological: Negative.    Hematological: Negative.    Psychiatric/Behavioral: Negative.        Current Outpatient Medications   Medication Sig Dispense Refill    ibuprofen (ADVIL,MOTRIN) 200 MG tablet Take 200 mg by mouth every 6 (six) hours as needed.      tamsulosin (FLOMAX) 0.4 mg Cap Take 0.4 mg by mouth once daily.       Current Facility-Administered Medications   Medication Dose Route Frequency Provider Last Rate Last Dose    lidocaine (PF) 10 mg/ml (1%) injection 10 mg  1 mL Intradermal Once Earl Garcia MD           Review of patient's allergies indicates:   Allergen Reactions    Pcn [penicillins]        Past Medical History:   Diagnosis Date    Enlarged prostate        Past Surgical History:   Procedure Laterality Date    HERNIA REPAIR Left     open, mesh ([facility] )    KNEE SURGERY      L    SHOULDER SURGERY      L       Family History   Problem Relation Age of Onset    Heart attack Father        Social History     Socioeconomic History    Marital status: Single     Spouse name: Not on file    Number of children: Not on file    Years of education: Not on file    Highest education level: Not on file   Occupational History    Not on file   Social Needs    Financial resource strain: Not on file    Food insecurity:     Worry: Not on file     Inability: Not  on file    Transportation needs:     Medical: Not on file     Non-medical: Not on file   Tobacco Use    Smoking status: Former Smoker     Years: 40.00   Substance and Sexual Activity    Alcohol use: Not Currently    Drug use: Yes     Types: Marijuana     Comment: daily 2 or 3 joints     Sexual activity: Not on file   Lifestyle    Physical activity:     Days per week: Not on file     Minutes per session: Not on file    Stress: Not on file   Relationships    Social connections:     Talks on phone: Not on file     Gets together: Not on file     Attends Shinto service: Not on file     Active member of club or organization: Not on file     Attends meetings of clubs or organizations: Not on file     Relationship status: Not on file   Other Topics Concern    Not on file   Social History Narrative    Not on file       Vitals:    01/30/20 1419   BP: 119/66   Pulse: 68   Temp: 97.8 °F (36.6 °C)       Physical Exam   Constitutional: He is oriented to person, place, and time. He appears well-developed and well-nourished. No distress.   HENT:   Head: Normocephalic and atraumatic.   Eyes: Pupils are equal, round, and reactive to light. No scleral icterus.   Neck: Normal range of motion. Neck supple. No tracheal deviation present.   Cardiovascular: Normal rate, regular rhythm and normal heart sounds.   Pulmonary/Chest: Effort normal and breath sounds normal.   Abdominal: Soft. Bowel sounds are normal. He exhibits no distension and no mass. There is no tenderness. There is no rebound and no guarding. A hernia (Reducible right inguinal hernia) is present.   Musculoskeletal: Normal range of motion.   Neurological: He is alert and oriented to person, place, and time.   Skin: Skin is warm and dry. Capillary refill takes less than 2 seconds.   Psychiatric: He has a normal mood and affect. His behavior is normal. Judgment and thought content normal.       Cardiac evaluation was reviewed  Assessment & Plan:      Reducible  right inguinal hernia.    Open repair with mesh either under general anesthetic or sedation local anesthetic per anesthesiology.    Preop labs were ordered    Risk of hernia repair includes infection, bleeding, mesh infection, testicular atrophy, and pain or numbness in the groin that may be long-lasting.

## 2020-02-06 NOTE — PRE ADMISSION SCREENING
Pre op instructions reviewed with patient per phone:    To confirm, Your surgeon has instructed you:  Surgery is scheduled 02/07/20 at 1220.      Please report to [facility] 1st floor main lobby by 1050.  Pre admit office to call afternoon prior to surgery with final arrival time only if arrival time changes.     INSTRUCTIONS IMPORTANT!!!  ¨ No smoking after 12 midnight, the night before surgery.  ¨ No solid food after 12 midnight, but you may have clear liquids up until 3 hours prior to surgery.  This includes: grape, cranberry, and apple juice (not orange, and no coffee.)   ¨ OK to brush teeth, but no gum, candy or mints!    ¨ Take only these medicines with a small swallow of water-morning of surgery.  N/A  ____  Do not wear makeup, including mascara.  ____  No powder, lotions or creams to surgical area.  ____  Please remove all jewelry, including piercings and leave at home.  ____  No money or valuables needed. Please leave at home.  ____  Please bring identification and insurance information to hospital.  ____  If going home the same day, arrange for a ride home. You will not be able to   drive if Anesthesia was used.  ____  Children, under 12 years old, must remain in the waiting room with an adult.  They are not allowed in patient areas.  ____  Wear loose fitting clothing. Allow for dressings, bandages.  ____  Stop Aspirin, Ibuprofen, Motrin and Aleve at least 5-7 days before surgery, unless otherwise instructed by your doctor, or the nurse.   You MAY use Tylenol/acetaminophen until day of surgery.  ____  If you take diabetic medication, do not take am of surgery unless instructed by   Doctor.  ____ Stop taking any Fish Oil supplement or any Vitamins that contain Vitamin E at least 5 days prior to surgery.          Bathing Instructions-- The night before surgery and the morning prior to coming to the hospital:   -Do not shave the surgical area.   -Shower and wash your hair and body as  usual with your regular soap and shampoo.   -Rinse your hair and body completely.   -Use one packet of hibiclens to wash the surgical site (using your hand) gently for 5 minutes.  Do not scrub you skin too hard.   -Do not use hibiclens on your head, face, or genitals.   -Do not wash with regular soap after you use the hibiclens.   -Rinse your body thoroughly.   -Dry with clean, soft towel.  Do not use lotion, cream, deodorant, or powders on   the surgical site.    Use antibacterial soap in place of hibiclens if your surgery is on the head, face or genitals.         Surgical Site Infection    Prevention of surgical site infections:     -Keep incisions clean and dry.   -Do not soak/submerge incisions in water until completely healed.   -Do not apply lotions, powders, creams, or deodorants to site.   -Always make sure hands are cleaned with antibacterial soap/ alcohol-based   prior to touching the surgical site.  (This includes doctors, nurses, staff, and yourself.)    Signs and symptoms:   -Redness and pain around the area where you had surgery   -Drainage of cloudy fluid from your surgical wound   -Fever over 100.4  I have read or had read and explained to me, and understand the above information.

## 2020-02-07 ENCOUNTER — HOSPITAL ENCOUNTER (OUTPATIENT)
Facility: HOSPITAL | Age: 65
Discharge: HOME | End: 2020-02-07
Attending: SURGERY | Admitting: SURGERY
Payer: MEDICARE

## 2020-02-07 ENCOUNTER — ANESTHESIA EVENT (OUTPATIENT)
Dept: SURGERY | Facility: HOSPITAL | Age: 65
End: 2020-02-07
Payer: MEDICARE

## 2020-02-07 ENCOUNTER — ANESTHESIA (OUTPATIENT)
Dept: SURGERY | Facility: HOSPITAL | Age: 65
End: 2020-02-07
Payer: MEDICARE

## 2020-02-07 VITALS
HEIGHT: 71 IN | RESPIRATION RATE: 20 BRPM | SYSTOLIC BLOOD PRESSURE: 141 MMHG | BODY MASS INDEX: 24.43 KG/M2 | TEMPERATURE: 98 F | WEIGHT: 174.5 LBS | OXYGEN SATURATION: 98 % | DIASTOLIC BLOOD PRESSURE: 85 MMHG | HEART RATE: 64 BPM

## 2020-02-07 DIAGNOSIS — K40.90 NON-RECURRENT UNILATERAL INGUINAL HERNIA WITHOUT OBSTRUCTION OR GANGRENE: Primary | ICD-10-CM

## 2020-02-07 PROCEDURE — 37000008 HC ANESTHESIA 1ST 15 MINUTES: Performed by: SURGERY

## 2020-02-07 PROCEDURE — S0077 INJECTION, CLINDAMYCIN PHOSP: HCPCS | Performed by: SURGERY

## 2020-02-07 PROCEDURE — 36000707: Performed by: SURGERY

## 2020-02-07 PROCEDURE — 49505 PRP I/HERN INIT REDUC >5 YR: CPT | Mod: RT,,, | Performed by: SURGERY

## 2020-02-07 PROCEDURE — 25000003 PHARM REV CODE 250: Performed by: NURSE ANESTHETIST, CERTIFIED REGISTERED

## 2020-02-07 PROCEDURE — 25000003 PHARM REV CODE 250: Performed by: SURGERY

## 2020-02-07 PROCEDURE — 37000009 HC ANESTHESIA EA ADD 15 MINS: Performed by: SURGERY

## 2020-02-07 PROCEDURE — 71000033 HC RECOVERY, INTIAL HOUR: Performed by: SURGERY

## 2020-02-07 PROCEDURE — C1781 MESH (IMPLANTABLE): HCPCS | Performed by: SURGERY

## 2020-02-07 PROCEDURE — 49505 PR REPAIR ING HERNIA,5+Y/O,REDUCIBL: ICD-10-PCS | Mod: RT,,, | Performed by: SURGERY

## 2020-02-07 PROCEDURE — C1729 CATH, DRAINAGE: HCPCS | Performed by: SURGERY

## 2020-02-07 PROCEDURE — 63600175 PHARM REV CODE 636 W HCPCS: Performed by: NURSE ANESTHETIST, CERTIFIED REGISTERED

## 2020-02-07 PROCEDURE — 36000706: Performed by: SURGERY

## 2020-02-07 PROCEDURE — 71000015 HC POSTOP RECOV 1ST HR: Performed by: SURGERY

## 2020-02-07 DEVICE — PLUG MEDIUM: Type: IMPLANTABLE DEVICE | Site: GROIN | Status: FUNCTIONAL

## 2020-02-07 RX ORDER — DEXAMETHASONE SODIUM PHOSPHATE 4 MG/ML
INJECTION, SOLUTION INTRA-ARTICULAR; INTRALESIONAL; INTRAMUSCULAR; INTRAVENOUS; SOFT TISSUE
Status: DISCONTINUED | OUTPATIENT
Start: 2020-02-07 | End: 2020-02-07

## 2020-02-07 RX ORDER — HYDROMORPHONE HYDROCHLORIDE 2 MG/ML
1 INJECTION, SOLUTION INTRAMUSCULAR; INTRAVENOUS; SUBCUTANEOUS EVERY 4 HOURS PRN
Status: DISCONTINUED | OUTPATIENT
Start: 2020-02-07 | End: 2020-02-07 | Stop reason: HOSPADM

## 2020-02-07 RX ORDER — HYDROCODONE BITARTRATE AND ACETAMINOPHEN 5; 325 MG/1; MG/1
1 TABLET ORAL EVERY 6 HOURS PRN
Qty: 20 TABLET | Refills: 0 | Status: SHIPPED | OUTPATIENT
Start: 2020-02-07 | End: 2020-03-02

## 2020-02-07 RX ORDER — ONDANSETRON 2 MG/ML
4 INJECTION INTRAMUSCULAR; INTRAVENOUS DAILY PRN
Status: DISCONTINUED | OUTPATIENT
Start: 2020-02-07 | End: 2020-02-07 | Stop reason: HOSPADM

## 2020-02-07 RX ORDER — PHENYLEPHRINE HYDROCHLORIDE 10 MG/ML
INJECTION INTRAVENOUS
Status: DISCONTINUED | OUTPATIENT
Start: 2020-02-07 | End: 2020-02-07

## 2020-02-07 RX ORDER — MIDAZOLAM HYDROCHLORIDE 1 MG/ML
INJECTION, SOLUTION INTRAMUSCULAR; INTRAVENOUS
Status: DISCONTINUED | OUTPATIENT
Start: 2020-02-07 | End: 2020-02-07

## 2020-02-07 RX ORDER — HYDROCODONE BITARTRATE AND ACETAMINOPHEN 7.5; 325 MG/1; MG/1
1 TABLET ORAL EVERY 6 HOURS PRN
Status: DISCONTINUED | OUTPATIENT
Start: 2020-02-07 | End: 2020-02-07 | Stop reason: HOSPADM

## 2020-02-07 RX ORDER — FENTANYL CITRATE 50 UG/ML
25 INJECTION, SOLUTION INTRAMUSCULAR; INTRAVENOUS EVERY 5 MIN PRN
Status: DISCONTINUED | OUTPATIENT
Start: 2020-02-07 | End: 2020-02-07 | Stop reason: HOSPADM

## 2020-02-07 RX ORDER — BUPIVACAINE HYDROCHLORIDE 2.5 MG/ML
INJECTION, SOLUTION EPIDURAL; INFILTRATION; INTRACAUDAL
Status: DISCONTINUED | OUTPATIENT
Start: 2020-02-07 | End: 2020-02-07 | Stop reason: HOSPADM

## 2020-02-07 RX ORDER — KETOROLAC TROMETHAMINE 30 MG/ML
INJECTION, SOLUTION INTRAMUSCULAR; INTRAVENOUS
Status: DISCONTINUED | OUTPATIENT
Start: 2020-02-07 | End: 2020-02-07

## 2020-02-07 RX ORDER — ONDANSETRON 8 MG/1
8 TABLET, ORALLY DISINTEGRATING ORAL EVERY 8 HOURS PRN
Status: DISCONTINUED | OUTPATIENT
Start: 2020-02-07 | End: 2020-02-07 | Stop reason: HOSPADM

## 2020-02-07 RX ORDER — SODIUM CHLORIDE, SODIUM LACTATE, POTASSIUM CHLORIDE, CALCIUM CHLORIDE 600; 310; 30; 20 MG/100ML; MG/100ML; MG/100ML; MG/100ML
INJECTION, SOLUTION INTRAVENOUS CONTINUOUS PRN
Status: DISCONTINUED | OUTPATIENT
Start: 2020-02-07 | End: 2020-02-07

## 2020-02-07 RX ORDER — PROPOFOL 10 MG/ML
VIAL (ML) INTRAVENOUS
Status: DISCONTINUED | OUTPATIENT
Start: 2020-02-07 | End: 2020-02-07

## 2020-02-07 RX ORDER — CLINDAMYCIN PHOSPHATE 900 MG/50ML
900 INJECTION, SOLUTION INTRAVENOUS
Status: COMPLETED | OUTPATIENT
Start: 2020-02-07 | End: 2020-02-07

## 2020-02-07 RX ORDER — FENTANYL CITRATE 50 UG/ML
INJECTION, SOLUTION INTRAMUSCULAR; INTRAVENOUS
Status: DISCONTINUED | OUTPATIENT
Start: 2020-02-07 | End: 2020-02-07

## 2020-02-07 RX ORDER — HYDROCODONE BITARTRATE AND ACETAMINOPHEN 5; 325 MG/1; MG/1
1 TABLET ORAL EVERY 4 HOURS PRN
Status: DISCONTINUED | OUTPATIENT
Start: 2020-02-07 | End: 2020-02-07 | Stop reason: HOSPADM

## 2020-02-07 RX ORDER — ONDANSETRON 2 MG/ML
INJECTION INTRAMUSCULAR; INTRAVENOUS
Status: DISCONTINUED | OUTPATIENT
Start: 2020-02-07 | End: 2020-02-07

## 2020-02-07 RX ORDER — GLYCOPYRROLATE 0.2 MG/ML
INJECTION INTRAMUSCULAR; INTRAVENOUS
Status: DISCONTINUED | OUTPATIENT
Start: 2020-02-07 | End: 2020-02-07

## 2020-02-07 RX ADMIN — PHENYLEPHRINE HYDROCHLORIDE 100 MCG: 10 INJECTION INTRAVENOUS at 08:02

## 2020-02-07 RX ADMIN — FENTANYL CITRATE 50 MCG: 50 INJECTION, SOLUTION INTRAMUSCULAR; INTRAVENOUS at 08:02

## 2020-02-07 RX ADMIN — FENTANYL CITRATE 50 MCG: 50 INJECTION, SOLUTION INTRAMUSCULAR; INTRAVENOUS at 09:02

## 2020-02-07 RX ADMIN — DEXAMETHASONE SODIUM PHOSPHATE 4 MG: 4 INJECTION, SOLUTION INTRA-ARTICULAR; INTRALESIONAL; INTRAMUSCULAR; INTRAVENOUS; SOFT TISSUE at 08:02

## 2020-02-07 RX ADMIN — PROPOFOL 100 MG: 10 INJECTION, EMULSION INTRAVENOUS at 08:02

## 2020-02-07 RX ADMIN — PHENYLEPHRINE HYDROCHLORIDE 100 MCG: 10 INJECTION INTRAVENOUS at 09:02

## 2020-02-07 RX ADMIN — MIDAZOLAM 2 MG: 1 INJECTION INTRAMUSCULAR; INTRAVENOUS at 08:02

## 2020-02-07 RX ADMIN — PROPOFOL 200 MG: 10 INJECTION, EMULSION INTRAVENOUS at 08:02

## 2020-02-07 RX ADMIN — KETOROLAC TROMETHAMINE 30 MG: 30 INJECTION, SOLUTION INTRAMUSCULAR; INTRAVENOUS at 09:02

## 2020-02-07 RX ADMIN — SODIUM CHLORIDE, SODIUM LACTATE, POTASSIUM CHLORIDE, AND CALCIUM CHLORIDE: 600; 310; 30; 20 INJECTION, SOLUTION INTRAVENOUS at 08:02

## 2020-02-07 RX ADMIN — ONDANSETRON 4 MG: 2 INJECTION, SOLUTION INTRAMUSCULAR; INTRAVENOUS at 08:02

## 2020-02-07 RX ADMIN — ROBINUL 0.2 MG: 0.2 INJECTION INTRAMUSCULAR; INTRAVENOUS at 08:02

## 2020-02-07 RX ADMIN — CLINDAMYCIN PHOSPHATE 900 MG: 18 INJECTION, SOLUTION INTRAVENOUS at 08:02

## 2020-02-07 NOTE — ANESTHESIA PREPROCEDURE EVALUATION
02/07/2020  Júnior Teague is a 64 y.o., male.    Anesthesia Evaluation    I have reviewed the Patient Summary Reports.    I have reviewed the Nursing Notes.   I have reviewed the Medications.     Review of Systems  Anesthesia Hx:  No problems with previous Anesthesia Denies Hx of Anesthetic complications  Denies Family Hx of Anesthesia complications.   Denies Personal Hx of Anesthesia complications.   Social:  Non-Smoker, No Alcohol Use    Cardiovascular:  Cardiovascular Normal  ECG has been reviewed.    Pulmonary:  Pulmonary Normal    Renal/:  Renal/ Normal     Hepatic/GI:  Hepatic/GI Normal    Neurological:  Neurology Normal    Endocrine:  Endocrine Normal    Psych:  Psychiatric Normal         Patient Active Problem List   Diagnosis    Enlarged prostate    Preop cardiovascular exam    Palpitations         Physical Exam  General:  Well nourished    Airway/Jaw/Neck:  Airway Findings: Mouth Opening: Normal Tongue: Normal  General Airway Assessment: Adult  Mallampati: II  TM Distance: 4 - 6 cm  Jaw/Neck Findings:  Neck ROM: Normal ROM      Dental:  Dental Findings: In tact   Chest/Lungs:  Chest/Lungs Findings: Clear to auscultation, Normal Respiratory Rate     Heart/Vascular:  Heart Findings: Rate: Normal  Rhythm: Regular Rhythm  Sounds: Normal        Mental Status:  Mental Status Findings:  Cooperative, Alert and Oriented       Lab Results   Component Value Date    WBC 7.95 01/30/2020    HGB 14.2 01/30/2020    HCT 46.0 01/30/2020    MCV 98 01/30/2020     01/30/2020         Chemistry        Component Value Date/Time     01/30/2020 1540    K 4.4 01/30/2020 1540     01/30/2020 1540    CO2 29 01/30/2020 1540    BUN 18 01/30/2020 1540    CREATININE 0.9 01/30/2020 1540    GLU 91 01/30/2020 1540        Component Value Date/Time    CALCIUM 9.3 01/30/2020 1540    ALKPHOS 73 01/30/2020  1540    AST 19 01/30/2020 1540    ALT 19 01/30/2020 1540    BILITOT 0.2 01/30/2020 1540    ESTGFRAFRICA >60.0 01/30/2020 1540    EGFRNONAA >60.0 01/30/2020 1540            Anesthesia Plan  Type of Anesthesia, risks & benefits discussed:  Anesthesia Type:  general  Patient's Preference:   Intra-op Monitoring Plan: standard ASA monitors  Intra-op Monitoring Plan Comments:   Post Op Pain Control Plan: per primary service following discharge from PACU  Post Op Pain Control Plan Comments:   Induction:   IV  Beta Blocker:  Patient is not currently on a Beta-Blocker (No further documentation required).       Informed Consent: Patient understands risks and agrees with Anesthesia plan.  Questions answered. Anesthesia consent signed with patient.  ASA Score: 1     Day of Surgery Review of History & Physical: I have interviewed and examined the patient. I have reviewed the patient's H&P dated:  There are no significant changes.  H&P update referred to the surgeon.         Ready For Surgery From Anesthesia Perspective.

## 2020-02-07 NOTE — TRANSFER OF CARE
"Anesthesia Transfer of Care Note    Patient: Júnior Teague    Procedure(s) Performed: Procedure(s) (LRB):  REPAIR, HERNIA, INGUINAL, WITHOUT HISTORY OF PRIOR REPAIR, AGE 5 YEARS OR OLDER (Right)    Patient location: PACU    Anesthesia Type: general    Transport from OR: Transported from OR on room air with adequate spontaneous ventilation    Post pain: adequate analgesia    Post assessment: no apparent anesthetic complications    Post vital signs: stable    Level of consciousness: awake, alert and oriented    Nausea/Vomiting: no nausea/vomiting    Complications: none    Transfer of care protocol was followed      Last vitals:   Visit Vitals  BP (!) 168/77 (BP Location: Right arm, Patient Position: Sitting)   Pulse (!) 52   Temp 36.6 °C (97.9 °F) (Oral)   Resp 18   Ht 5' 11" (1.803 m)   Wt 79.2 kg (174 lb 7.9 oz)   SpO2 97%   BMI 24.34 kg/m²     "

## 2020-02-07 NOTE — DISCHARGE SUMMARY
[facility]  Discharge Note  Short Stay    Procedure(s) (LRB):  REPAIR, HERNIA, INGUINAL, WITHOUT HISTORY OF PRIOR REPAIR, AGE 4 YEARS OR OLDER (Right)    OUTCOME: Patient tolerated treatment/procedure well without complication and is now ready for discharge.     Open repair of a right inguinal hernia with a plug and patch mesh    DISPOSITION: Home or Self Care    FINAL DIAGNOSIS:  <principal problem not specified>    FOLLOWUP: In clinic    DISCHARGE INSTRUCTIONS:    Discharge Procedure Orders   Diet general     Call MD for:  temperature >100.4     Call MD for:  persistent nausea and vomiting     Call MD for:  severe uncontrolled pain     Call MD for:  difficulty breathing, headache or visual disturbances     Call MD for:  redness, tenderness, or signs of infection (pain, swelling, redness, odor or green/yellow discharge around incision site)     Lifting restrictions   Order Comments: No lifting more than 30 pounds for 2 weeks     Remove dressing in 72 hours        Clinical Reference Documents Added to Patient Instructions       Document    ACETAMINOPHEN; HYDROCODONE TABLETS OR CAPSULES (ENGLISH)    HERNIA SURGERY, AFTER (ENGLISH)

## 2020-02-07 NOTE — OP NOTE
Operative Note       Surgery Date: 2/7/2020     Surgeon(s) and Role:     * Earl Garcia MD - Primary    Pre-op Diagnosis:  Non-recurrent inguinal hernia of right side without obstruction or gangrene [K40.90]    Post-op Diagnosis: Post-Op Diagnosis Codes:     * Non-recurrent inguinal hernia of right side without obstruction or gangrene [K40.90]    Procedure(s) (LRB):  REPAIR, HERNIA, INGUINAL, WITHOUT HISTORY OF PRIOR REPAIR, AGE 5 YEARS OR OLDER (Right)    Anesthesia: Choice    Procedure in Detail/Findings:    Findings:  A moderate size indirect inguinal hernia, cord lipoma    Patient was brought into the operative room placed on the operating table in the supine position.    IV sedation were initiated by the anesthesia service but this was converted to a laryngeal mask anesthesia.  Antibiotics were given intra operatively before the incision, and sequential compression devices were placed on both legs for DVT prophylaxis     The groin was prepped and draped in sterile fashion.     A right inguinal incision was made between the anterior superior iliac spine and the pubic tubercle.  The incision was deepened through Virys and Campers fascia with electrocautery until the aponeurosis of the external oblique was encountered. Bridge vein were divided and tied with vicryl ties. This was cleaned and the external ring was exposed.  Hemostasis was achieved in the wound.  An incision was made in the midportion of the external oblique aponeurosis in the direction of its fibers.  The ilioinguinal nerve was identified and protected throughout the dissection.  Flaps of the external oblique were developed cephalad and inferiorly.    The cord was identified.  It was gently dissected free at the pubic tubercle and encircled with a Penrose drain.  The sac was identified and carefully dissected free of the cord down to the level of the internal ring.  The vas deferens and testicular vessels were identified and protected from  injury.  The sac was reduced.  A finger was passed into the peritoneal cavity and the floor of the inguinal canal assessed and found to be weak. The femoral canal was palpated and no hernia identified.  The sac was allowed to retract into the abdomen.    A cord lipoma was excised by clamping it proximally and dividing it distally and then like getting the clamped tissue with a 0 silk suture     A plug of ultrapro mesh was placed in the indirect hernia and secured around the cord using 0 Vicryl suture.  A polypropylene mesh was cut to the appropriate size with a lateral opening for the spermatic cord.  Beginning at the pubic tubercle, the mesh was sutured to the inguinal ligament inferiorly with running 0 vrycril suture, and the conjoined tendon superiorly with interrupted 0 vicryl sutures.  Care was taken to assure that the mesh was placed in a tension-free fashion and that no neurovascular structures were caught in the repair.  Laterally, the tails of the mesh were crossed and the internal ring recreated, allowing for passage of the surgeons fifth fingertip.    Hemostasis was again checked.  The Penrose  drain was removed.  The external oblique aponeurosis was closed with a running suture of 0 Vicryl, taking care not to catch the ilioinguinal nerve in the suture line.  Virys fascia was closed with interrupted 3-0 Vicryl.  The skin was closed with a running subcuticular 4-0 Monocryl suture.    The testis was gently pulled down into its anatomic position in the scrotum.      The patient tolerated the procedure well and was taken to the postanesthesia care unit in stable condition.     Estimated Blood Loss:  10 mL  IV fluids 400 mL  Marcaine 0.25% 30 mL           Specimens (From admission, onward)    None        Implants:   Implant Name Type Inv. Item Serial No.  Lot No. LRB No. Used   PLUG MEDIUM - [id number]  PLUG [employer]UM  [employer] [id number] Right 1              Disposition: PACU - hemodynamically  stable.           Condition: Stable    Attestation:  I performed the procedure.           Discharge Note    Admit Date: 2/7/2020    Attending Physician: Earl Garcia MD     Discharge Physician: Earl Garcia MD    Final Diagnosis: Post-Op Diagnosis Codes:     * Non-recurrent inguinal hernia of right side without obstruction or gangrene [K40.90]    Disposition: Home or Self Care    Patient Instructions:   Current Discharge Medication List      START taking these medications    Details   HYDROcodone-acetaminophen (NORCO) 5-325 mg per tablet Take 1 tablet by mouth every 6 (six) hours as needed for Pain.  Qty: 20 tablet, Refills: 0    Comments: Quantity prescribed more than 7 day supply? No         CONTINUE these medications which have NOT CHANGED    Details   tamsulosin (FLOMAX) 0.4 mg Cap Take 0.4 mg by mouth once daily.      ibuprofen (ADVIL,MOTRIN) 200 MG tablet Take 200 mg by mouth every 6 (six) hours as needed.             Discharge Procedure Orders (must include Diet, Follow-up, Activity)   Discharge Procedure Orders (must include Diet, Follow-up, Activity)   Diet general     Call MD for:  temperature >100.4     Call MD for:  persistent nausea and vomiting     Call MD for:  severe uncontrolled pain     Call MD for:  difficulty breathing, headache or visual disturbances     Call MD for:  redness, tenderness, or signs of infection (pain, swelling, redness, odor or green/yellow discharge around incision site)     Lifting restrictions   Order Comments: No lifting more than 30 pounds for 2 weeks     Remove dressing in 72 hours        Discharge Date: No discharge date for patient encounter.

## 2020-02-07 NOTE — ANESTHESIA POSTPROCEDURE EVALUATION
Anesthesia Post Evaluation    Patient: Júnior Teague    Procedure(s) Performed: Procedure(s) (LRB):  REPAIR, HERNIA, INGUINAL, WITHOUT HISTORY OF PRIOR REPAIR, AGE 5 YEARS OR OLDER (Right)    Final Anesthesia Type: general    Patient location during evaluation: PACU  Patient participation: Yes- Able to Participate  Level of consciousness: awake and alert  Post-procedure vital signs: reviewed and stable  Pain management: adequate  Airway patency: patent    PONV status at discharge: No PONV  Anesthetic complications: no      Cardiovascular status: hemodynamically stable  Respiratory status: spontaneous ventilation  Hydration status: euvolemic  Follow-up not needed.          Vitals Value Taken Time   /85 2/7/2020 10:54 AM   Temp 36.7 °C (98 °F) 2/7/2020 10:54 AM   Pulse 64 2/7/2020 10:54 AM   Resp 20 2/7/2020 10:54 AM   SpO2 98 % 2/7/2020 10:54 AM         Event Time     Out of Recovery 10:27:23          Pain/Octavio Score: Octavio Score: 10 (2/7/2020 10:27 AM)

## 2020-03-02 ENCOUNTER — OFFICE VISIT (OUTPATIENT)
Dept: SURGERY | Facility: CLINIC | Age: 65
End: 2020-03-02
Payer: MEDICARE

## 2020-03-02 VITALS
HEART RATE: 62 BPM | WEIGHT: 174 LBS | TEMPERATURE: 98 F | BODY MASS INDEX: 24.36 KG/M2 | DIASTOLIC BLOOD PRESSURE: 68 MMHG | HEIGHT: 71 IN | SYSTOLIC BLOOD PRESSURE: 109 MMHG

## 2020-03-02 DIAGNOSIS — K40.90 NON-RECURRENT UNILATERAL INGUINAL HERNIA WITHOUT OBSTRUCTION OR GANGRENE: Primary | ICD-10-CM

## 2020-03-02 PROCEDURE — 99999 PR PBB SHADOW E&M-EST. PATIENT-LVL III: ICD-10-PCS | Mod: PBBFAC,,, | Performed by: SURGERY

## 2020-03-02 PROCEDURE — 99024 PR POST-OP FOLLOW-UP VISIT: ICD-10-PCS | Mod: S$GLB,,, | Performed by: SURGERY

## 2020-03-02 PROCEDURE — 99999 PR PBB SHADOW E&M-EST. PATIENT-LVL III: CPT | Mod: PBBFAC,,, | Performed by: SURGERY

## 2020-03-02 PROCEDURE — 99024 POSTOP FOLLOW-UP VISIT: CPT | Mod: S$GLB,,, | Performed by: SURGERY

## 2020-03-02 NOTE — PROGRESS NOTES
Subjective:       Patient ID: Júnior Teague is a 64 y.o. male.    Post open repair of a right inguinal hernia    Chief Complaint: Post-op Evaluation (inguinal hernia repair)    Patient is postop pain is resolved.  He reports a slight fullness    Review of Systems   Gastrointestinal: Negative.        Objective:      Physical Exam   Constitutional: He appears well-developed and well-nourished.   Abdominal: Soft. Bowel sounds are normal.   Right inguinal incision is healing well.  There is a slight fullness.  There is no ecchymosis.  There is no evidence of a recurrent   Vitals reviewed.      Assessment:      doing well after right inguinal hernia repair, open with mesh     Plan:        Activity as tolerated.  Follow up with surgery as needed

## 2020-03-02 NOTE — PATIENT INSTRUCTIONS
There are no limits on your activity    The fullness will go away over time    We will see you back as needed

## 2020-07-13 DIAGNOSIS — R00.2 PALPITATIONS: Primary | ICD-10-CM

## 2020-07-14 ENCOUNTER — HOSPITAL ENCOUNTER (OUTPATIENT)
Dept: CARDIOLOGY | Facility: HOSPITAL | Age: 65
Discharge: HOME | End: 2020-07-14
Attending: INTERNAL MEDICINE
Payer: MEDICARE

## 2020-07-14 ENCOUNTER — OFFICE VISIT (OUTPATIENT)
Dept: CARDIOLOGY | Facility: CLINIC | Age: 65
End: 2020-07-14
Payer: MEDICARE

## 2020-07-14 VITALS
WEIGHT: 171.94 LBS | HEART RATE: 53 BPM | OXYGEN SATURATION: 96 % | SYSTOLIC BLOOD PRESSURE: 104 MMHG | DIASTOLIC BLOOD PRESSURE: 80 MMHG | BODY MASS INDEX: 24.07 KG/M2 | HEIGHT: 71 IN

## 2020-07-14 DIAGNOSIS — R00.2 PALPITATIONS: Primary | ICD-10-CM

## 2020-07-14 DIAGNOSIS — R00.2 PALPITATIONS: ICD-10-CM

## 2020-07-14 PROCEDURE — 93005 ELECTROCARDIOGRAM TRACING: CPT

## 2020-07-14 PROCEDURE — 99999 PR PBB SHADOW E&M-EST. PATIENT-LVL III: CPT | Mod: PBBFAC,,, | Performed by: INTERNAL MEDICINE

## 2020-07-14 PROCEDURE — 1101F PR PT FALLS ASSESS DOC 0-1 FALLS W/OUT INJ PAST YR: ICD-10-PCS | Mod: CPTII,S$GLB,, | Performed by: INTERNAL MEDICINE

## 2020-07-14 PROCEDURE — 99213 OFFICE O/P EST LOW 20 MIN: CPT | Mod: S$GLB,,, | Performed by: INTERNAL MEDICINE

## 2020-07-14 PROCEDURE — 99999 PR PBB SHADOW E&M-EST. PATIENT-LVL III: ICD-10-PCS | Mod: PBBFAC,,, | Performed by: INTERNAL MEDICINE

## 2020-07-14 PROCEDURE — 3008F PR BODY MASS INDEX (BMI) DOCUMENTED: ICD-10-PCS | Mod: CPTII,S$GLB,, | Performed by: INTERNAL MEDICINE

## 2020-07-14 PROCEDURE — 93010 ELECTROCARDIOGRAM REPORT: CPT | Mod: ,,, | Performed by: INTERNAL MEDICINE

## 2020-07-14 PROCEDURE — 93010 EKG 12-LEAD: ICD-10-PCS | Mod: ,,, | Performed by: INTERNAL MEDICINE

## 2020-07-14 PROCEDURE — 3008F BODY MASS INDEX DOCD: CPT | Mod: CPTII,S$GLB,, | Performed by: INTERNAL MEDICINE

## 2020-07-14 PROCEDURE — 1101F PT FALLS ASSESS-DOCD LE1/YR: CPT | Mod: CPTII,S$GLB,, | Performed by: INTERNAL MEDICINE

## 2020-07-14 PROCEDURE — 99213 PR OFFICE/OUTPT VISIT, EST, LEVL III, 20-29 MIN: ICD-10-PCS | Mod: S$GLB,,, | Performed by: INTERNAL MEDICINE

## 2020-07-14 NOTE — PROGRESS NOTES
Subjective:   Patient ID:  Júnior Teague is a 64 y.o. male who presents for cardiac consult of Follow-up      Palpitations     Follow-up      The patient came in today for cardiac consult of Follow-up      Júnior Teague is a 64 y.o. male pt with BPH, prior tobacco use presents for follow up CV eval.     19  He was in the ER earlier for palpitations, onset several weeks PTA. Symptoms were  intermittent and moderate in severity. No mitigating or exacerbating factors reported. No associated sxs reported. Patient denies any fever, chills, n/v/d, SOB, CP, weakness, numbness, dizziness, headache, and all other sxs at this time. No prior Tx reported. Labwork, ECG, CXR negative. UDS positive for THC.  FH - CAD  He also has a right inguinal hernia and will need surgery by Dr. Garcia.   ECG - NSR  He has been having palpitations a few months recently but has resolved recently as he stopped drinking coffee. Overall active, walks a dog twice a day.     20  ECHO neg, ETT neg but target HR not reached. Holter neg. He is still waiting for hernia surgery. Symptoms are getting worse with mild pain and bulge.     20  Overall is active, walks his dog daily. No CP/SOB. He had hernia surgery since last visit which went well x 2. Had stopped flomax and palpitations resolved. He does not have great stream of urination.   ECG - sinus akanksha, V rate 51    Patient no chest pain, no sob, no leg swelling, no PND,  no dizziness, no syncope, no CNS symptoms.    Patient has fairly good exercise tolerance.    Patient is compliant with medications.    FH - CAD, father  of MI 71      CONCLUSIONS     1 - Concentric remodeling.     2 - No wall motion abnormalities.     3 - Normal left ventricular systolic function (EF 55-60%).     4 - Normal left ventricular diastolic function.     5 - Normal right ventricular systolic function .     6 - The estimated PA systolic pressure is 25 mmHg.     This document has been  electronically    SIGNED BY: Estiven Andrews MD On: 2019 17:14    EKG Conclusions:  1. The EKG portion of this study is negative for ischemia at a low workload, and peak heart rate of 113 bpm (73% of predicted).   2. Exercise capacity is average.   3. Blood pressure response to exercise was hypertensive (Presenting BP: 120/82 Peak BP: 190/98).   4. No significant arrhythmias were present.   5. There were no symptoms of chest discomfort or significant dyspnea throughout the protocol.   6. The Duke treadmill score was 9 suggesting a low probability for future cardiovascular events.    Target heart rate not reached but no ischemia noted.     This document has been electronically    SIGNED BY: Marin Lehman MD On: 2019 14:02      TEST DESCRIPTION   PREDOMINANT RHYTHM  1. Sinus rhythm with heart rates varying between 39 and 117 bpm with an average of 65 bpm.   VENTRICULAR ARRHYTHMIAS  1. There were very rare PVCs recorded totalling 8 and averaging less than 1 per hour.   2. There were no episodes of ventricular tachycardia.    SUPRA VENTRICULAR ARRHYTHMIAS  1. There were rare PACs totalling 199 and averaging 4 per hour.  There were 9 couplets. There was 1 triplet.   2. There were no episodes of sustained supraventricular tachycardia.    Past Medical History:   Diagnosis Date    Enlarged prostate        Past Surgical History:   Procedure Laterality Date    HERNIA REPAIR Left     open, mesh ([facility] )    KNEE SURGERY Left     L    SHOULDER SURGERY Left     L       Social History     Tobacco Use    Smoking status: Former Smoker     Years: 40.00     Quit date:      Years since quittin.5    Smokeless tobacco: Never Used   Substance Use Topics    Alcohol use: Yes     Comment: occas: hold today prior to surgery    Drug use: Yes     Types: Marijuana     Comment: daily 2 or 3 joints; hold today 20 prior to surgery        Family History   Problem Relation Age of Onset    Heart attack Father   "      Patient's Medications   New Prescriptions    No medications on file   Previous Medications    IBUPROFEN (ADVIL,MOTRIN) 200 MG TABLET    Take 200 mg by mouth every 6 (six) hours as needed.    TAMSULOSIN (FLOMAX) 0.4 MG CAP    Take 0.4 mg by mouth once daily.   Modified Medications    No medications on file   Discontinued Medications    No medications on file       Review of Systems   Constitutional: Negative.    HENT: Negative.    Eyes: Negative.    Respiratory: Negative.    Cardiovascular: Positive for palpitations.   Gastrointestinal: Negative.    Genitourinary: Negative.    Musculoskeletal: Negative.    Skin: Negative.    Neurological: Negative.    Endo/Heme/Allergies: Negative.    Psychiatric/Behavioral: Negative.    All 12 systems otherwise negative.      Wt Readings from Last 3 Encounters:   07/14/20 78 kg (171 lb 15.3 oz)   03/02/20 78.9 kg (174 lb)   02/07/20 79.2 kg (174 lb 7.9 oz)     Temp Readings from Last 3 Encounters:   03/02/20 98.1 °F (36.7 °C)   02/07/20 98 °F (36.7 °C) (Temporal)   01/30/20 97.8 °F (36.6 °C) (Oral)     BP Readings from Last 3 Encounters:   07/14/20 104/80   03/02/20 109/68   02/07/20 (!) 141/85     Pulse Readings from Last 3 Encounters:   07/14/20 (!) 53   03/02/20 62   02/07/20 64       /80 (BP Location: Right arm, Patient Position: Sitting, BP Method: Large (Manual))   Pulse (!) 53   Ht 5' 11" (1.803 m)   Wt 78 kg (171 lb 15.3 oz)   SpO2 96%   BMI 23.98 kg/m²     Objective:   Physical Exam   Constitutional: He is oriented to person, place, and time. He appears well-developed and well-nourished. No distress.   HENT:   Head: Normocephalic and atraumatic.   Nose: Nose normal.   Mouth/Throat: Oropharynx is clear and moist.   Eyes: Conjunctivae and EOM are normal. No scleral icterus.   Neck: Normal range of motion. Neck supple. No JVD present. No thyromegaly present.   Cardiovascular: Normal rate, regular rhythm, S1 normal and S2 normal. Exam reveals no gallop, no S3, " no S4 and no friction rub.   No murmur heard.  Pulmonary/Chest: Effort normal and breath sounds normal. No stridor. No respiratory distress. He has no wheezes. He has no rales. He exhibits no tenderness.   Abdominal: Soft. Bowel sounds are normal. He exhibits no distension and no mass. There is no abdominal tenderness. There is no rebound.   Genitourinary:    Genitourinary Comments: Deferred     Musculoskeletal: Normal range of motion.         General: No tenderness, deformity or edema.   Lymphadenopathy:     He has no cervical adenopathy.   Neurological: He is alert and oriented to person, place, and time. He exhibits normal muscle tone. Coordination normal.   Skin: Skin is warm and dry. No rash noted. He is not diaphoretic. No erythema. No pallor.   Psychiatric: He has a normal mood and affect. His behavior is normal. Judgment and thought content normal.   Nursing note and vitals reviewed.      Lab Results   Component Value Date     01/30/2020    K 4.4 01/30/2020     01/30/2020    CO2 29 01/30/2020    BUN 18 01/30/2020    CREATININE 0.9 01/30/2020    GLU 91 01/30/2020    MG 2.1 11/03/2019    AST 19 01/30/2020    ALT 19 01/30/2020    ALBUMIN 4.0 01/30/2020    PROT 7.0 01/30/2020    BILITOT 0.2 01/30/2020    WBC 7.95 01/30/2020    HGB 14.2 01/30/2020    HCT 46.0 01/30/2020    MCV 98 01/30/2020     01/30/2020    INR 1.0 11/03/2019    TSH 0.718 11/03/2019    BNP 20 11/03/2019     Assessment:      1. Palpitations        Plan:   1. Palpitations - resolved  - neg workup in ER  - Holter - neg, avoid caffeine  - echo normal, stress neg    Thank you for allowing me to participate in this patient's care. Please do not hesitate to contact me with any questions or concerns. Consult note has been forwarded to the referral physician.     Marin Lehman MD, [facility]  Cardiovascular Disease  [facility], [place]  [phone] (P)

## (undated) DEVICE — SOL 9P NACL IRR PIC IL

## (undated) DEVICE — GLOVE SURG BIOGEL LATEX SZ 7.5

## (undated) DEVICE — SUT MONOCRYL 4.0 PS2 CP496G

## (undated) DEVICE — COVER OVERHEAD SURG LT BLUE

## (undated) DEVICE — ELECTRODE REM PLYHSV RETURN 9

## (undated) DEVICE — SUT VICRYL 0 SH

## (undated) DEVICE — DRAPE LAP TIBURON 77X122IN

## (undated) DEVICE — SEE MEDLINE ITEM 157027

## (undated) DEVICE — NDL SAFETY 25G X 1.5 ECLIPSE

## (undated) DEVICE — CLOSURE SKIN STERI STRIP 1/2X4

## (undated) DEVICE — SUT CTD VICRYL 0 UND BR SUT

## (undated) DEVICE — ADHESIVE MASTISOL VIAL 48/BX

## (undated) DEVICE — DECANTER VIAL ASEPTIC TRANSFER

## (undated) DEVICE — SEE MEDLINE ITEM 157117

## (undated) DEVICE — SUT MONOCYRL 4-0 PS2 UND

## (undated) DEVICE — MANIFOLD 4 PORT

## (undated) DEVICE — DRESSING TRANS 4X4 TEGADERM

## (undated) DEVICE — DRAIN PENRS STERILE LTX 18X1/2

## (undated) DEVICE — APPLICATOR CHLORAPREP ORN 26ML

## (undated) DEVICE — GAUZE SPONGE 4X4 12PLY

## (undated) DEVICE — SUT VICRYL 3-0 27 SH

## (undated) DEVICE — SYR 10CC LUER LOCK